# Patient Record
Sex: MALE | Race: WHITE | NOT HISPANIC OR LATINO | Employment: FULL TIME | URBAN - METROPOLITAN AREA
[De-identification: names, ages, dates, MRNs, and addresses within clinical notes are randomized per-mention and may not be internally consistent; named-entity substitution may affect disease eponyms.]

---

## 2021-06-11 ENCOUNTER — HOSPITAL ENCOUNTER (EMERGENCY)
Facility: HOSPITAL | Age: 51
Discharge: HOME | End: 2021-06-12
Attending: EMERGENCY MEDICINE
Payer: COMMERCIAL

## 2021-06-11 ENCOUNTER — APPOINTMENT (EMERGENCY)
Dept: CT IMAGING | Facility: HOSPITAL | Age: 51
End: 2021-06-11
Payer: COMMERCIAL

## 2021-06-11 VITALS
RESPIRATION RATE: 18 BRPM | BODY MASS INDEX: 34.64 KG/M2 | DIASTOLIC BLOOD PRESSURE: 101 MMHG | SYSTOLIC BLOOD PRESSURE: 142 MMHG | HEART RATE: 90 BPM | OXYGEN SATURATION: 97 % | WEIGHT: 220.68 LBS | HEIGHT: 67 IN | TEMPERATURE: 98.2 F

## 2021-06-11 DIAGNOSIS — R10.13 EPIGASTRIC PAIN: ICD-10-CM

## 2021-06-11 DIAGNOSIS — R07.89 ATYPICAL CHEST PAIN: Primary | ICD-10-CM

## 2021-06-11 DIAGNOSIS — K20.90 ESOPHAGITIS: ICD-10-CM

## 2021-06-11 LAB
ALBUMIN SERPL BCP-MCNC: 3.7 G/DL (ref 3.5–5)
ALP SERPL-CCNC: 104 U/L (ref 46–116)
ALT SERPL W P-5'-P-CCNC: 63 U/L (ref 12–78)
ANION GAP SERPL CALCULATED.3IONS-SCNC: 10 MMOL/L (ref 4–13)
AST SERPL W P-5'-P-CCNC: 68 U/L (ref 5–45)
ATRIAL RATE: 103 BPM
ATRIAL RATE: 108 BPM
BASOPHILS # BLD AUTO: 0.06 THOUSANDS/ΜL (ref 0–0.1)
BASOPHILS NFR BLD AUTO: 1 % (ref 0–1)
BILIRUB DIRECT SERPL-MCNC: 0.11 MG/DL (ref 0–0.2)
BILIRUB SERPL-MCNC: 0.45 MG/DL (ref 0.2–1)
BUN SERPL-MCNC: 7 MG/DL (ref 5–25)
CALCIUM SERPL-MCNC: 8 MG/DL (ref 8.3–10.1)
CHLORIDE SERPL-SCNC: 102 MMOL/L (ref 100–108)
CO2 SERPL-SCNC: 26 MMOL/L (ref 21–32)
CREAT SERPL-MCNC: 1.1 MG/DL (ref 0.6–1.3)
EOSINOPHIL # BLD AUTO: 0.02 THOUSAND/ΜL (ref 0–0.61)
EOSINOPHIL NFR BLD AUTO: 0 % (ref 0–6)
ERYTHROCYTE [DISTWIDTH] IN BLOOD BY AUTOMATED COUNT: 13.2 % (ref 11.6–15.1)
GFR SERPL CREATININE-BSD FRML MDRD: 77 ML/MIN/1.73SQ M
GLUCOSE SERPL-MCNC: 121 MG/DL (ref 65–140)
HCT VFR BLD AUTO: 47.7 % (ref 36.5–49.3)
HGB BLD-MCNC: 16.9 G/DL (ref 12–17)
IMM GRANULOCYTES # BLD AUTO: 0.03 THOUSAND/UL (ref 0–0.2)
IMM GRANULOCYTES NFR BLD AUTO: 0 % (ref 0–2)
LIPASE SERPL-CCNC: 119 U/L (ref 73–393)
LYMPHOCYTES # BLD AUTO: 2.5 THOUSANDS/ΜL (ref 0.6–4.47)
LYMPHOCYTES NFR BLD AUTO: 35 % (ref 14–44)
MCH RBC QN AUTO: 30.6 PG (ref 26.8–34.3)
MCHC RBC AUTO-ENTMCNC: 35.4 G/DL (ref 31.4–37.4)
MCV RBC AUTO: 86 FL (ref 82–98)
MONOCYTES # BLD AUTO: 0.79 THOUSAND/ΜL (ref 0.17–1.22)
MONOCYTES NFR BLD AUTO: 11 % (ref 4–12)
NEUTROPHILS # BLD AUTO: 3.73 THOUSANDS/ΜL (ref 1.85–7.62)
NEUTS SEG NFR BLD AUTO: 53 % (ref 43–75)
NRBC BLD AUTO-RTO: 0 /100 WBCS
P AXIS: 46 DEGREES
P AXIS: 46 DEGREES
PLATELET # BLD AUTO: 237 THOUSANDS/UL (ref 149–390)
PMV BLD AUTO: 10.4 FL (ref 8.9–12.7)
POTASSIUM SERPL-SCNC: 3.2 MMOL/L (ref 3.5–5.3)
PR INTERVAL: 160 MS
PR INTERVAL: 160 MS
PROT SERPL-MCNC: 7.2 G/DL (ref 6.4–8.2)
QRS AXIS: 26 DEGREES
QRS AXIS: 29 DEGREES
QRSD INTERVAL: 84 MS
QRSD INTERVAL: 84 MS
QT INTERVAL: 306 MS
QT INTERVAL: 316 MS
QTC INTERVAL: 410 MS
QTC INTERVAL: 413 MS
RBC # BLD AUTO: 5.52 MILLION/UL (ref 3.88–5.62)
SODIUM SERPL-SCNC: 138 MMOL/L (ref 136–145)
T WAVE AXIS: -3 DEGREES
T WAVE AXIS: 13 DEGREES
TROPONIN I SERPL-MCNC: <0.02 NG/ML
VENTRICULAR RATE: 103 BPM
VENTRICULAR RATE: 108 BPM
WBC # BLD AUTO: 7.13 THOUSAND/UL (ref 4.31–10.16)

## 2021-06-11 PROCEDURE — 80048 BASIC METABOLIC PNL TOTAL CA: CPT | Performed by: EMERGENCY MEDICINE

## 2021-06-11 PROCEDURE — 83690 ASSAY OF LIPASE: CPT | Performed by: EMERGENCY MEDICINE

## 2021-06-11 PROCEDURE — 96375 TX/PRO/DX INJ NEW DRUG ADDON: CPT

## 2021-06-11 PROCEDURE — 93010 ELECTROCARDIOGRAM REPORT: CPT | Performed by: INTERNAL MEDICINE

## 2021-06-11 PROCEDURE — 74177 CT ABD & PELVIS W/CONTRAST: CPT

## 2021-06-11 PROCEDURE — 36415 COLL VENOUS BLD VENIPUNCTURE: CPT | Performed by: EMERGENCY MEDICINE

## 2021-06-11 PROCEDURE — 84484 ASSAY OF TROPONIN QUANT: CPT | Performed by: EMERGENCY MEDICINE

## 2021-06-11 PROCEDURE — 80076 HEPATIC FUNCTION PANEL: CPT | Performed by: EMERGENCY MEDICINE

## 2021-06-11 PROCEDURE — 96365 THER/PROPH/DIAG IV INF INIT: CPT

## 2021-06-11 PROCEDURE — 96366 THER/PROPH/DIAG IV INF ADDON: CPT

## 2021-06-11 PROCEDURE — 99284 EMERGENCY DEPT VISIT MOD MDM: CPT | Performed by: EMERGENCY MEDICINE

## 2021-06-11 PROCEDURE — 99285 EMERGENCY DEPT VISIT HI MDM: CPT

## 2021-06-11 PROCEDURE — 85025 COMPLETE CBC W/AUTO DIFF WBC: CPT | Performed by: EMERGENCY MEDICINE

## 2021-06-11 PROCEDURE — 93005 ELECTROCARDIOGRAM TRACING: CPT

## 2021-06-11 RX ORDER — FAMOTIDINE 20 MG/1
20 TABLET, FILM COATED ORAL 2 TIMES DAILY
Qty: 14 TABLET | Refills: 0 | Status: SHIPPED | OUTPATIENT
Start: 2021-06-11

## 2021-06-11 RX ORDER — MAGNESIUM HYDROXIDE/ALUMINUM HYDROXICE/SIMETHICONE 120; 1200; 1200 MG/30ML; MG/30ML; MG/30ML
30 SUSPENSION ORAL ONCE
Status: COMPLETED | OUTPATIENT
Start: 2021-06-11 | End: 2021-06-11

## 2021-06-11 RX ORDER — MORPHINE SULFATE 4 MG/ML
4 INJECTION, SOLUTION INTRAMUSCULAR; INTRAVENOUS ONCE
Status: COMPLETED | OUTPATIENT
Start: 2021-06-11 | End: 2021-06-11

## 2021-06-11 RX ORDER — LIDOCAINE HYDROCHLORIDE 20 MG/ML
15 SOLUTION OROPHARYNGEAL ONCE
Status: COMPLETED | OUTPATIENT
Start: 2021-06-11 | End: 2021-06-11

## 2021-06-11 RX ORDER — SUCRALFATE ORAL 1 G/10ML
1 SUSPENSION ORAL 4 TIMES DAILY
Qty: 420 ML | Refills: 0 | Status: SHIPPED | OUTPATIENT
Start: 2021-06-11 | End: 2022-05-25

## 2021-06-11 RX ORDER — OMEPRAZOLE 20 MG/1
20 CAPSULE, DELAYED RELEASE ORAL 2 TIMES DAILY
Qty: 28 CAPSULE | Refills: 0 | Status: SHIPPED | OUTPATIENT
Start: 2021-06-11 | End: 2021-06-25

## 2021-06-11 RX ORDER — ONDANSETRON 4 MG/1
4 TABLET, ORALLY DISINTEGRATING ORAL EVERY 6 HOURS PRN
Qty: 20 TABLET | Refills: 0 | Status: SHIPPED | OUTPATIENT
Start: 2021-06-11 | End: 2022-05-25

## 2021-06-11 RX ORDER — SUCRALFATE 1 G/1
1 TABLET ORAL ONCE
Status: COMPLETED | OUTPATIENT
Start: 2021-06-11 | End: 2021-06-11

## 2021-06-11 RX ORDER — ONDANSETRON 2 MG/ML
4 INJECTION INTRAMUSCULAR; INTRAVENOUS ONCE
Status: COMPLETED | OUTPATIENT
Start: 2021-06-11 | End: 2021-06-11

## 2021-06-11 RX ORDER — KETOROLAC TROMETHAMINE 30 MG/ML
15 INJECTION, SOLUTION INTRAMUSCULAR; INTRAVENOUS ONCE
Status: COMPLETED | OUTPATIENT
Start: 2021-06-11 | End: 2021-06-11

## 2021-06-11 RX ORDER — FAMOTIDINE 20 MG/1
40 TABLET, FILM COATED ORAL ONCE
Status: COMPLETED | OUTPATIENT
Start: 2021-06-11 | End: 2021-06-11

## 2021-06-11 RX ADMIN — LIDOCAINE HYDROCHLORIDE 15 ML: 20 SOLUTION ORAL; TOPICAL at 21:59

## 2021-06-11 RX ADMIN — SODIUM CHLORIDE, SODIUM LACTATE, POTASSIUM CHLORIDE, AND CALCIUM CHLORIDE 1000 ML: .6; .31; .03; .02 INJECTION, SOLUTION INTRAVENOUS at 22:00

## 2021-06-11 RX ADMIN — KETOROLAC TROMETHAMINE 15 MG: 30 INJECTION, SOLUTION INTRAMUSCULAR at 22:00

## 2021-06-11 RX ADMIN — FAMOTIDINE 40 MG: 20 TABLET ORAL at 23:41

## 2021-06-11 RX ADMIN — ALUMINA, MAGNESIA, AND SIMETHICONE ORAL SUSPENSION REGULAR STRENGTH 30 ML: 1200; 1200; 120 SUSPENSION ORAL at 21:59

## 2021-06-11 RX ADMIN — ONDANSETRON 4 MG: 2 INJECTION INTRAMUSCULAR; INTRAVENOUS at 22:01

## 2021-06-11 RX ADMIN — IOHEXOL 100 ML: 350 INJECTION, SOLUTION INTRAVENOUS at 22:09

## 2021-06-11 RX ADMIN — SUCRALFATE 1 G: 1 TABLET ORAL at 23:41

## 2021-06-11 RX ADMIN — MORPHINE SULFATE 4 MG: 4 INJECTION INTRAVENOUS at 22:00

## 2021-06-11 NOTE — Clinical Note
Silvia Olguin was seen and treated in our emergency department on 6/11/2021  Diagnosis:     Carlos  may return to work on return date  He may return on this date: 06/14/2021         If you have any questions or concerns, please don't hesitate to call        Jef Guerrero MD    ______________________________           _______________          _______________  Hospital Representative                              Date                                Time

## 2021-06-12 NOTE — ED PROVIDER NOTES
History  Chief Complaint   Patient presents with    Chest Pain     Pt reports having chest pain that comes and goes since yesterday  wife reports hesd been drinking a lot  History provided by:  Patient   used: No    Chest Pain  Pain location:  Substernal area and epigastric  Pain quality: burning    Pain radiates to:  Does not radiate  Pain radiates to the back: no    Pain severity:  Severe  Onset quality:  Gradual  Duration:  1 day  Timing:  Constant  Progression:  Waxing and waning  Chronicity:  New  Relieved by:  Nothing  Worsened by:  Nothing tried  Ineffective treatments:  None tried  Associated symptoms: nausea    Associated symptoms: no abdominal pain, no back pain, no cough, no fever, no palpitations, no shortness of breath and not vomiting        None       History reviewed  No pertinent past medical history  History reviewed  No pertinent surgical history  History reviewed  No pertinent family history  I have reviewed and agree with the history as documented  E-Cigarette/Vaping    E-Cigarette Use Never User      E-Cigarette/Vaping Substances     Social History     Tobacco Use    Smoking status: Never Smoker    Smokeless tobacco: Never Used   Vaping Use    Vaping Use: Never used   Substance Use Topics    Alcohol use: Not on file    Drug use: Never       Review of Systems   Constitutional: Negative for chills and fever  HENT: Negative for ear pain and sore throat  Eyes: Negative for pain and visual disturbance  Respiratory: Negative for cough and shortness of breath  Cardiovascular: Positive for chest pain  Negative for palpitations  Gastrointestinal: Positive for nausea  Negative for abdominal pain and vomiting  Genitourinary: Negative for dysuria and hematuria  Musculoskeletal: Negative for arthralgias and back pain  Skin: Negative for color change and rash  Neurological: Negative for seizures and syncope     All other systems reviewed and are negative  Physical Exam  Physical Exam  Vitals and nursing note reviewed  Constitutional:       Appearance: He is well-developed  HENT:      Head: Normocephalic and atraumatic  Eyes:      Conjunctiva/sclera: Conjunctivae normal    Cardiovascular:      Rate and Rhythm: Normal rate and regular rhythm  Heart sounds: No murmur heard  Pulmonary:      Effort: Pulmonary effort is normal  No respiratory distress  Breath sounds: Normal breath sounds  Abdominal:      Palpations: Abdomen is soft  Tenderness: There is no abdominal tenderness  Musculoskeletal:      Cervical back: Neck supple  Skin:     General: Skin is warm and dry  Capillary Refill: Capillary refill takes less than 2 seconds  Neurological:      General: No focal deficit present  Mental Status: He is alert and oriented to person, place, and time           Vital Signs  ED Triage Vitals   Temperature Pulse Respirations Blood Pressure SpO2   06/11/21 2035 06/11/21 2035 06/11/21 2035 06/11/21 2035 06/11/21 2035   98 2 °F (36 8 °C) (!) 106 18 134/94 94 %      Temp Source Heart Rate Source Patient Position - Orthostatic VS BP Location FiO2 (%)   06/11/21 2035 06/11/21 2035 06/11/21 2100 06/11/21 2035 --   Oral Monitor Lying Right arm       Pain Score       06/11/21 2100       6           Vitals:    06/11/21 2035 06/11/21 2100 06/11/21 2215 06/11/21 2344   BP: 134/94 130/83 120/81 (!) 142/101   Pulse: (!) 106 99 94 90   Patient Position - Orthostatic VS:  Lying  Sitting         Visual Acuity      ED Medications  Medications   lactated ringers bolus 1,000 mL (0 mL Intravenous Stopped 6/12/21 0004)   ondansetron (ZOFRAN) injection 4 mg (4 mg Intravenous Given 6/11/21 2201)   ketorolac (TORADOL) injection 15 mg (15 mg Intravenous Given 6/11/21 2200)   morphine (PF) 4 mg/mL injection 4 mg (4 mg Intravenous Given 6/11/21 2200)   aluminum-magnesium hydroxide-simethicone (MYLANTA) oral suspension 30 mL (30 mL Oral Given 6/11/21 2159)   Lidocaine Viscous HCl (XYLOCAINE) 2 % mucosal solution 15 mL (15 mL Swish & Spit Given 6/11/21 2159)   iohexol (OMNIPAQUE) 350 MG/ML injection (SINGLE-DOSE) 100 mL (100 mL Intravenous Given 6/11/21 2209)   sucralfate (CARAFATE) tablet 1 g (1 g Oral Given 6/11/21 2341)   famotidine (PEPCID) tablet 40 mg (40 mg Oral Given 6/11/21 2341)       Diagnostic Studies  Results Reviewed     Procedure Component Value Units Date/Time    Lipase [[id number]]  (Normal) Collected: 06/11/21 2137    Lab Status: Final result Specimen: Blood from Arm, Left Updated: 06/11/21 2204     Lipase 119 u/L     Basic metabolic panel [[id number]]  (Abnormal) Collected: 06/11/21 2137    Lab Status: Final result Specimen: Blood from Arm, Left Updated: 06/11/21 2204     Sodium 138 mmol/L      Potassium 3 2 mmol/L      Chloride 102 mmol/L      CO2 26 mmol/L      ANION GAP 10 mmol/L      BUN 7 mg/dL      Creatinine 1 10 mg/dL      Glucose 121 mg/dL      Calcium 8 0 mg/dL      eGFR 77 ml/min/1 73sq m     Narrative:      [place] guidelines for Chronic Kidney Disease (CKD):     Stage 1 with normal or high GFR (GFR > 90 mL/min/1 73 square meters)    Stage 2 Mild CKD (GFR = 60-89 mL/min/1 73 square meters)    Stage 3A Moderate CKD (GFR = 45-59 mL/min/1 73 square meters)    Stage 3B Moderate CKD (GFR = 30-44 mL/min/1 73 square meters)    Stage 4 Severe CKD (GFR = 15-29 mL/min/1 73 square meters)    Stage 5 End Stage CKD (GFR <15 mL/min/1 73 square meters)  Note: GFR calculation is accurate only with a steady state creatinine    Hepatic function panel [[id number]]  (Abnormal) Collected: 06/11/21 2137    Lab Status: Final result Specimen: Blood from Arm, Left Updated: 06/11/21 2204     Total Bilirubin 0 45 mg/dL      Bilirubin, Direct 0 11 mg/dL      Alkaline Phosphatase 104 U/L      AST 68 U/L      ALT 63 U/L      Total Protein 7 2 g/dL      Albumin 3 7 g/dL     Troponin I [[id number]]  (Normal) Collected: 06/11/21 4450 Lab Status: Final result Specimen: Blood from Arm, Left Updated: 06/11/21 2137     Troponin I <0 02 ng/mL     CBC and differential [[id number]] Collected: 06/11/21 2106    Lab Status: Final result Specimen: Blood from Arm, Left Updated: 06/11/21 2121     WBC 7 13 Thousand/uL      RBC 5 52 Million/uL      Hemoglobin 16 9 g/dL      Hematocrit 47 7 %      MCV 86 fL      MCH 30 6 pg      MCHC 35 4 g/dL      RDW 13 2 %      MPV 10 4 fL      Platelets 562 Thousands/uL      nRBC 0 /100 WBCs      Neutrophils Relative 53 %      Immat GRANS % 0 %      Lymphocytes Relative 35 %      Monocytes Relative 11 %      Eosinophils Relative 0 %      Basophils Relative 1 %      Neutrophils Absolute 3 73 Thousands/µL      Immature Grans Absolute 0 03 Thousand/uL      Lymphocytes Absolute 2 50 Thousands/µL      Monocytes Absolute 0 79 Thousand/µL      Eosinophils Absolute 0 02 Thousand/µL      Basophils Absolute 0 06 Thousands/µL                  CT abdomen pelvis with contrast   Final Result by Ale Garza MD (06/11 2312)      Moderate circumferential wall thickening noted of the visualized distal esophagus suspicious for an esophagitis  Nonemergent esophagram or endoscopy could be performed for further evaluation  No free air or free fluid              Workstation performed: [id number]                    Procedures  Procedures         ED Course             HEART Risk Score      Most Recent Value   Heart Score Risk Calculator   History  0 Filed at: 06/11/2021 2151   ECG  0 Filed at: 06/11/2021 2151   Age  1 Filed at: 06/11/2021 2151   Risk Factors  1 Filed at: 06/11/2021 2151   Troponin  0 Filed at: 06/11/2021 2151   HEART Score  2 Filed at: 06/11/2021 2151                                    MDM  Number of Diagnoses or Management Options  Atypical chest pain: new and requires workup  Epigastric pain: new and requires workup  Esophagitis: new and requires workup  Diagnosis management comments: A 58-year-old male presented for severe epigastric and substernal chest pain that started yesterday, waxing and waning  Patient does admit to heavy alcohol use over the past several days  He was visiting the area and was celebrating with friends and family, reports that he does not typically drink so heavily  His symptoms improved significantly with GI cocktail and Pepcid, Carafate  Laboratory studies significant for mild elevation in AST, consistent with reported recent heavy alcohol use  Troponin is negative an EKG is without acute ST changes to suggest cardiac ischemia  CT scan of the abdomen pelvis does suggest esophagitis which is again consistent with the patient's reported recent heavy alcohol use  We discussed these findings with patient and his wife  Heart score is two  Recommend follow-up with PCP and Gastroenterology  Discussed return precautions  Amount and/or Complexity of Data Reviewed  Clinical lab tests: reviewed and ordered  Tests in the radiology section of CPT®: reviewed and ordered  Review and summarize past medical records: yes  Independent visualization of images, tracings, or specimens: yes        Disposition  Final diagnoses:   Atypical chest pain   Epigastric pain   Esophagitis     Time reflects when diagnosis was documented in both MDM as applicable and the Disposition within this note     Time User Action Codes Description Comment    6/11/2021 11:32 PM Clerance Selam Add [R07 89] Atypical chest pain     6/11/2021 11:32 PM Clerance Selam Remove [R07 89] Atypical chest pain     6/11/2021 11:32 PM Clerance Selam Add [R07 89] Atypical chest pain     6/11/2021 11:32 PM Clerance Selam Add [R10 13] Epigastric pain     6/11/2021 11:32 PM Clerance Selam Add [K20 90] Esophagitis       ED Disposition     ED Disposition Condition Date/Time Comment    Discharge Stable Fri Jun 11, 2021 11:32  [address] [facility] [address] discharge to home/self care              Follow-up Information     Follow up With Specialties Details Why Contact Info Additional Information    [facility] [place] [facility]   [facility] [address] [address] [address] [facility] [place], [address] [facility] [address], [place], [place], [phone]          Discharge Medication List as of 6/11/2021 11:36 PM      START taking these medications    Details   famotidine (PEPCID) 20 mg tablet Take 1 tablet (20 mg total) by mouth 2 (two) times a day, Starting Fri 6/11/2021, Print      omeprazole (PriLOSEC) 20 mg delayed release capsule Take 1 capsule (20 mg total) by mouth 2 (two) times a day for 14 days, Starting Fri 6/11/2021, Until Fri 6/25/2021, Print      ondansetron (ZOFRAN-ODT) 4 mg disintegrating tablet Take 1 tablet (4 mg total) by mouth every 6 (six) hours as needed for nausea or vomiting, Starting Fri 6/11/2021, Print      sucralfate (CARAFATE) 1 g/10 mL suspension Take 10 mL (1 g total) by mouth 4 (four) times a day for 11 days, Starting Fri 6/11/2021, Until Tue 6/22/2021, Print           No discharge procedures on file      PDMP Review     None          ED Provider  Electronically Signed by           Emily Rodriguez MD  07/06/21 0625

## 2022-05-17 ENCOUNTER — APPOINTMENT (EMERGENCY)
Dept: CT IMAGING | Facility: HOSPITAL | Age: 52
End: 2022-05-17
Payer: COMMERCIAL

## 2022-05-17 ENCOUNTER — HOSPITAL ENCOUNTER (EMERGENCY)
Facility: HOSPITAL | Age: 52
Discharge: HOME | End: 2022-05-18
Attending: EMERGENCY MEDICINE
Payer: COMMERCIAL

## 2022-05-17 DIAGNOSIS — R41.0 INTERMITTENT CONFUSION: Primary | ICD-10-CM

## 2022-05-17 DIAGNOSIS — R51.9 HEADACHE: ICD-10-CM

## 2022-05-17 LAB
ALBUMIN SERPL BCP-MCNC: 3.9 G/DL (ref 3.5–5)
ALP SERPL-CCNC: 108 U/L (ref 46–116)
ALT SERPL W P-5'-P-CCNC: 30 U/L (ref 12–78)
AMMONIA PLAS-SCNC: 13 UMOL/L (ref 11–35)
ANION GAP SERPL CALCULATED.3IONS-SCNC: 7 MMOL/L (ref 4–13)
APAP SERPL-MCNC: <2 UG/ML (ref 10–20)
APTT PPP: 27 SECONDS (ref 23–37)
AST SERPL W P-5'-P-CCNC: 14 U/L (ref 5–45)
BASE EX.OXY STD BLDV CALC-SCNC: 72.8 % (ref 60–80)
BASE EXCESS BLDV CALC-SCNC: 1.6 MMOL/L
BASOPHILS # BLD AUTO: 0.05 THOUSANDS/ΜL (ref 0–0.1)
BASOPHILS NFR BLD AUTO: 1 % (ref 0–1)
BILIRUB SERPL-MCNC: 0.41 MG/DL (ref 0.2–1)
BUN SERPL-MCNC: 21 MG/DL (ref 5–25)
CALCIUM SERPL-MCNC: 9 MG/DL (ref 8.3–10.1)
CHLORIDE SERPL-SCNC: 103 MMOL/L (ref 100–108)
CO2 SERPL-SCNC: 29 MMOL/L (ref 21–32)
CREAT SERPL-MCNC: 1.17 MG/DL (ref 0.6–1.3)
EOSINOPHIL # BLD AUTO: 0.14 THOUSAND/ΜL (ref 0–0.61)
EOSINOPHIL NFR BLD AUTO: 1 % (ref 0–6)
ERYTHROCYTE [DISTWIDTH] IN BLOOD BY AUTOMATED COUNT: 13.2 % (ref 11.6–15.1)
ETHANOL SERPL-MCNC: <3 MG/DL (ref 0–3)
GFR SERPL CREATININE-BSD FRML MDRD: 70 ML/MIN/1.73SQ M
GLUCOSE SERPL-MCNC: 99 MG/DL (ref 65–140)
HCO3 BLDV-SCNC: 27.3 MMOL/L (ref 24–30)
HCT VFR BLD AUTO: 44.5 % (ref 36.5–49.3)
HGB BLD-MCNC: 15.1 G/DL (ref 12–17)
IMM GRANULOCYTES # BLD AUTO: 0.04 THOUSAND/UL (ref 0–0.2)
IMM GRANULOCYTES NFR BLD AUTO: 0 % (ref 0–2)
INR PPP: 0.93 (ref 0.84–1.19)
LYMPHOCYTES # BLD AUTO: 3.23 THOUSANDS/ΜL (ref 0.6–4.47)
LYMPHOCYTES NFR BLD AUTO: 30 % (ref 14–44)
MCH RBC QN AUTO: 29.8 PG (ref 26.8–34.3)
MCHC RBC AUTO-ENTMCNC: 33.9 G/DL (ref 31.4–37.4)
MCV RBC AUTO: 88 FL (ref 82–98)
MONOCYTES # BLD AUTO: 0.99 THOUSAND/ΜL (ref 0.17–1.22)
MONOCYTES NFR BLD AUTO: 9 % (ref 4–12)
NEUTROPHILS # BLD AUTO: 6.2 THOUSANDS/ΜL (ref 1.85–7.62)
NEUTS SEG NFR BLD AUTO: 59 % (ref 43–75)
NRBC BLD AUTO-RTO: 0 /100 WBCS
O2 CT BLDV-SCNC: 16.1 ML/DL
PCO2 BLDV: 46.8 MM HG (ref 42–50)
PH BLDV: 7.38 [PH] (ref 7.3–7.4)
PLATELET # BLD AUTO: 224 THOUSANDS/UL (ref 149–390)
PMV BLD AUTO: 11.2 FL (ref 8.9–12.7)
PO2 BLDV: 38.4 MM HG (ref 35–45)
POTASSIUM SERPL-SCNC: 4.3 MMOL/L (ref 3.5–5.3)
PROT SERPL-MCNC: 7.1 G/DL (ref 6.4–8.2)
PROTHROMBIN TIME: 12.2 SECONDS (ref 11.6–14.5)
RBC # BLD AUTO: 5.06 MILLION/UL (ref 3.88–5.62)
SALICYLATES SERPL-MCNC: <3 MG/DL (ref 3–20)
SODIUM SERPL-SCNC: 139 MMOL/L (ref 136–145)
WBC # BLD AUTO: 10.65 THOUSAND/UL (ref 4.31–10.16)

## 2022-05-17 PROCEDURE — 85610 PROTHROMBIN TIME: CPT | Performed by: EMERGENCY MEDICINE

## 2022-05-17 PROCEDURE — 99285 EMERGENCY DEPT VISIT HI MDM: CPT | Performed by: EMERGENCY MEDICINE

## 2022-05-17 PROCEDURE — 85730 THROMBOPLASTIN TIME PARTIAL: CPT | Performed by: EMERGENCY MEDICINE

## 2022-05-17 PROCEDURE — 99285 EMERGENCY DEPT VISIT HI MDM: CPT

## 2022-05-17 PROCEDURE — 85025 COMPLETE CBC W/AUTO DIFF WBC: CPT | Performed by: EMERGENCY MEDICINE

## 2022-05-17 PROCEDURE — 80053 COMPREHEN METABOLIC PANEL: CPT | Performed by: EMERGENCY MEDICINE

## 2022-05-17 PROCEDURE — 80143 DRUG ASSAY ACETAMINOPHEN: CPT | Performed by: EMERGENCY MEDICINE

## 2022-05-17 PROCEDURE — 82077 ASSAY SPEC XCP UR&BREATH IA: CPT | Performed by: EMERGENCY MEDICINE

## 2022-05-17 PROCEDURE — 70450 CT HEAD/BRAIN W/O DYE: CPT

## 2022-05-17 PROCEDURE — 93005 ELECTROCARDIOGRAM TRACING: CPT

## 2022-05-17 PROCEDURE — 82140 ASSAY OF AMMONIA: CPT | Performed by: EMERGENCY MEDICINE

## 2022-05-17 PROCEDURE — 36415 COLL VENOUS BLD VENIPUNCTURE: CPT | Performed by: EMERGENCY MEDICINE

## 2022-05-17 PROCEDURE — 80179 DRUG ASSAY SALICYLATE: CPT | Performed by: EMERGENCY MEDICINE

## 2022-05-17 PROCEDURE — 82805 BLOOD GASES W/O2 SATURATION: CPT | Performed by: EMERGENCY MEDICINE

## 2022-05-18 VITALS
SYSTOLIC BLOOD PRESSURE: 142 MMHG | WEIGHT: 212.96 LBS | TEMPERATURE: 98.3 F | HEART RATE: 68 BPM | BODY MASS INDEX: 33.35 KG/M2 | DIASTOLIC BLOOD PRESSURE: 82 MMHG | RESPIRATION RATE: 18 BRPM | OXYGEN SATURATION: 98 %

## 2022-05-18 LAB
ATRIAL RATE: 69 BPM
P AXIS: 37 DEGREES
PR INTERVAL: 168 MS
QRS AXIS: 41 DEGREES
QRSD INTERVAL: 86 MS
QT INTERVAL: 366 MS
QTC INTERVAL: 392 MS
T WAVE AXIS: 9 DEGREES
VENTRICULAR RATE: 69 BPM

## 2022-05-18 PROCEDURE — 93010 ELECTROCARDIOGRAM REPORT: CPT | Performed by: INTERNAL MEDICINE

## 2022-05-23 ENCOUNTER — APPOINTMENT (OUTPATIENT)
Dept: MRI IMAGING | Facility: HOSPITAL | Age: 52
End: 2022-05-23
Payer: COMMERCIAL

## 2022-05-23 ENCOUNTER — HOSPITAL ENCOUNTER (OUTPATIENT)
Facility: HOSPITAL | Age: 52
Setting detail: OBSERVATION
Discharge: HOME | End: 2022-05-25
Attending: EMERGENCY MEDICINE | Admitting: FAMILY MEDICINE
Payer: COMMERCIAL

## 2022-05-23 ENCOUNTER — APPOINTMENT (EMERGENCY)
Dept: CT IMAGING | Facility: HOSPITAL | Age: 52
End: 2022-05-23
Payer: COMMERCIAL

## 2022-05-23 ENCOUNTER — APPOINTMENT (EMERGENCY)
Dept: RADIOLOGY | Facility: HOSPITAL | Age: 52
End: 2022-05-23
Payer: COMMERCIAL

## 2022-05-23 DIAGNOSIS — R41.82 ALTERED MENTAL STATUS: Primary | ICD-10-CM

## 2022-05-23 DIAGNOSIS — E87.2 LACTIC ACIDOSIS: ICD-10-CM

## 2022-05-23 DIAGNOSIS — R45.1 AGITATION: ICD-10-CM

## 2022-05-23 LAB
2HR DELTA HS TROPONIN: 1 NG/L
4HR DELTA HS TROPONIN: 0 NG/L
ALBUMIN SERPL BCP-MCNC: 3.8 G/DL (ref 3.5–5)
ALP SERPL-CCNC: 98 U/L (ref 46–116)
ALT SERPL W P-5'-P-CCNC: 47 U/L (ref 12–78)
AMMONIA PLAS-SCNC: 18 UMOL/L (ref 11–35)
AMPHETAMINES SERPL QL SCN: NEGATIVE
ANION GAP SERPL CALCULATED.3IONS-SCNC: 11 MMOL/L (ref 4–13)
AST SERPL W P-5'-P-CCNC: 52 U/L (ref 5–45)
ATRIAL RATE: 84 BPM
BARBITURATES UR QL: NEGATIVE
BASOPHILS # BLD AUTO: 0.04 THOUSANDS/ΜL (ref 0–0.1)
BASOPHILS NFR BLD AUTO: 1 % (ref 0–1)
BENZODIAZ UR QL: NEGATIVE
BILIRUB SERPL-MCNC: 0.51 MG/DL (ref 0.2–1)
BILIRUB UR QL STRIP: NEGATIVE
BUN SERPL-MCNC: 12 MG/DL (ref 5–25)
CALCIUM SERPL-MCNC: 9.1 MG/DL (ref 8.3–10.1)
CARDIAC TROPONIN I PNL SERPL HS: 5 NG/L
CARDIAC TROPONIN I PNL SERPL HS: 5 NG/L
CARDIAC TROPONIN I PNL SERPL HS: 6 NG/L
CHLORIDE SERPL-SCNC: 102 MMOL/L (ref 100–108)
CLARITY UR: CLEAR
CO2 SERPL-SCNC: 26 MMOL/L (ref 21–32)
COCAINE UR QL: NEGATIVE
COLOR UR: YELLOW
CREAT SERPL-MCNC: 1.17 MG/DL (ref 0.6–1.3)
EOSINOPHIL # BLD AUTO: 0.08 THOUSAND/ΜL (ref 0–0.61)
EOSINOPHIL NFR BLD AUTO: 1 % (ref 0–6)
ERYTHROCYTE [DISTWIDTH] IN BLOOD BY AUTOMATED COUNT: 13.2 % (ref 11.6–15.1)
ETHANOL SERPL-MCNC: <3 MG/DL (ref 0–3)
FOLATE SERPL-MCNC: 19.2 NG/ML (ref 3.1–17.5)
GFR SERPL CREATININE-BSD FRML MDRD: 70 ML/MIN/1.73SQ M
GLUCOSE SERPL-MCNC: 118 MG/DL (ref 65–140)
GLUCOSE SERPL-MCNC: 125 MG/DL (ref 65–140)
GLUCOSE SERPL-MCNC: 140 MG/DL (ref 65–140)
GLUCOSE UR STRIP-MCNC: NEGATIVE MG/DL
HCT VFR BLD AUTO: 46.6 % (ref 36.5–49.3)
HGB BLD-MCNC: 15.8 G/DL (ref 12–17)
HGB UR QL STRIP.AUTO: NEGATIVE
IMM GRANULOCYTES # BLD AUTO: 0.03 THOUSAND/UL (ref 0–0.2)
IMM GRANULOCYTES NFR BLD AUTO: 0 % (ref 0–2)
KETONES UR STRIP-MCNC: NEGATIVE MG/DL
LACTATE SERPL-SCNC: 1.3 MMOL/L (ref 0.5–2)
LACTATE SERPL-SCNC: 2.8 MMOL/L (ref 0.5–2)
LEUKOCYTE ESTERASE UR QL STRIP: NEGATIVE
LYMPHOCYTES # BLD AUTO: 2.16 THOUSANDS/ΜL (ref 0.6–4.47)
LYMPHOCYTES NFR BLD AUTO: 27 % (ref 14–44)
MAGNESIUM SERPL-MCNC: 2 MG/DL (ref 1.6–2.6)
MCH RBC QN AUTO: 29.6 PG (ref 26.8–34.3)
MCHC RBC AUTO-ENTMCNC: 33.9 G/DL (ref 31.4–37.4)
MCV RBC AUTO: 87 FL (ref 82–98)
METHADONE UR QL: NEGATIVE
MONOCYTES # BLD AUTO: 0.61 THOUSAND/ΜL (ref 0.17–1.22)
MONOCYTES NFR BLD AUTO: 8 % (ref 4–12)
NEUTROPHILS # BLD AUTO: 5.21 THOUSANDS/ΜL (ref 1.85–7.62)
NEUTS SEG NFR BLD AUTO: 63 % (ref 43–75)
NITRITE UR QL STRIP: NEGATIVE
NRBC BLD AUTO-RTO: 0 /100 WBCS
OPIATES UR QL SCN: NEGATIVE
OXYCODONE+OXYMORPHONE UR QL SCN: NEGATIVE
P AXIS: 51 DEGREES
PCP UR QL: NEGATIVE
PH UR STRIP.AUTO: 6 [PH]
PLATELET # BLD AUTO: 238 THOUSANDS/UL (ref 149–390)
PMV BLD AUTO: 11.1 FL (ref 8.9–12.7)
POTASSIUM SERPL-SCNC: 3.9 MMOL/L (ref 3.5–5.3)
PR INTERVAL: 160 MS
PROT SERPL-MCNC: 7.6 G/DL (ref 6.4–8.2)
PROT UR STRIP-MCNC: NEGATIVE MG/DL
QRS AXIS: 37 DEGREES
QRSD INTERVAL: 80 MS
QT INTERVAL: 324 MS
QTC INTERVAL: 382 MS
RBC # BLD AUTO: 5.33 MILLION/UL (ref 3.88–5.62)
SODIUM SERPL-SCNC: 139 MMOL/L (ref 136–145)
SP GR UR STRIP.AUTO: 1.02 (ref 1–1.03)
T WAVE AXIS: 36 DEGREES
THC UR QL: NEGATIVE
TSH SERPL DL<=0.05 MIU/L-ACNC: 2.07 UIU/ML (ref 0.45–4.5)
UROBILINOGEN UR QL STRIP.AUTO: 0.2 E.U./DL
VENTRICULAR RATE: 84 BPM
VIT B12 SERPL-MCNC: 199 PG/ML (ref 100–900)
WBC # BLD AUTO: 8.13 THOUSAND/UL (ref 4.31–10.16)

## 2022-05-23 PROCEDURE — 99205 OFFICE O/P NEW HI 60 MIN: CPT | Performed by: PSYCHIATRY & NEUROLOGY

## 2022-05-23 PROCEDURE — 82607 VITAMIN B-12: CPT | Performed by: PHYSICIAN ASSISTANT

## 2022-05-23 PROCEDURE — 83735 ASSAY OF MAGNESIUM: CPT | Performed by: EMERGENCY MEDICINE

## 2022-05-23 PROCEDURE — 96360 HYDRATION IV INFUSION INIT: CPT

## 2022-05-23 PROCEDURE — 81003 URINALYSIS AUTO W/O SCOPE: CPT | Performed by: EMERGENCY MEDICINE

## 2022-05-23 PROCEDURE — 70553 MRI BRAIN STEM W/O & W/DYE: CPT

## 2022-05-23 PROCEDURE — 99220 PR INITIAL OBSERVATION CARE/DAY 70 MINUTES: CPT | Performed by: FAMILY MEDICINE

## 2022-05-23 PROCEDURE — 83605 ASSAY OF LACTIC ACID: CPT | Performed by: EMERGENCY MEDICINE

## 2022-05-23 PROCEDURE — 84443 ASSAY THYROID STIM HORMONE: CPT | Performed by: EMERGENCY MEDICINE

## 2022-05-23 PROCEDURE — 86592 SYPHILIS TEST NON-TREP QUAL: CPT | Performed by: PHYSICIAN ASSISTANT

## 2022-05-23 PROCEDURE — 36415 COLL VENOUS BLD VENIPUNCTURE: CPT | Performed by: EMERGENCY MEDICINE

## 2022-05-23 PROCEDURE — 80053 COMPREHEN METABOLIC PANEL: CPT | Performed by: EMERGENCY MEDICINE

## 2022-05-23 PROCEDURE — A9585 GADOBUTROL INJECTION: HCPCS | Performed by: PHYSICIAN ASSISTANT

## 2022-05-23 PROCEDURE — 71046 X-RAY EXAM CHEST 2 VIEWS: CPT

## 2022-05-23 PROCEDURE — 82948 REAGENT STRIP/BLOOD GLUCOSE: CPT

## 2022-05-23 PROCEDURE — 85025 COMPLETE CBC W/AUTO DIFF WBC: CPT | Performed by: EMERGENCY MEDICINE

## 2022-05-23 PROCEDURE — 84484 ASSAY OF TROPONIN QUANT: CPT | Performed by: EMERGENCY MEDICINE

## 2022-05-23 PROCEDURE — 82077 ASSAY SPEC XCP UR&BREATH IA: CPT | Performed by: EMERGENCY MEDICINE

## 2022-05-23 PROCEDURE — 99285 EMERGENCY DEPT VISIT HI MDM: CPT

## 2022-05-23 PROCEDURE — 93010 ELECTROCARDIOGRAM REPORT: CPT | Performed by: INTERNAL MEDICINE

## 2022-05-23 PROCEDURE — 93005 ELECTROCARDIOGRAM TRACING: CPT

## 2022-05-23 PROCEDURE — 82746 ASSAY OF FOLIC ACID SERUM: CPT | Performed by: PHYSICIAN ASSISTANT

## 2022-05-23 PROCEDURE — 99285 EMERGENCY DEPT VISIT HI MDM: CPT | Performed by: EMERGENCY MEDICINE

## 2022-05-23 PROCEDURE — 70450 CT HEAD/BRAIN W/O DYE: CPT

## 2022-05-23 PROCEDURE — 80307 DRUG TEST PRSMV CHEM ANLYZR: CPT | Performed by: EMERGENCY MEDICINE

## 2022-05-23 PROCEDURE — 84425 ASSAY OF VITAMIN B-1: CPT | Performed by: PHYSICIAN ASSISTANT

## 2022-05-23 PROCEDURE — 82140 ASSAY OF AMMONIA: CPT | Performed by: EMERGENCY MEDICINE

## 2022-05-23 PROCEDURE — G1004 CDSM NDSC: HCPCS

## 2022-05-23 RX ORDER — LORAZEPAM 0.5 MG/1
0.5 TABLET ORAL ONCE
Status: COMPLETED | OUTPATIENT
Start: 2022-05-23 | End: 2022-05-23

## 2022-05-23 RX ORDER — FAMOTIDINE 20 MG/1
20 TABLET, FILM COATED ORAL 2 TIMES DAILY
Status: DISCONTINUED | OUTPATIENT
Start: 2022-05-23 | End: 2022-05-25 | Stop reason: HOSPADM

## 2022-05-23 RX ORDER — LORAZEPAM 2 MG/ML
4 INJECTION INTRAMUSCULAR ONCE
Status: COMPLETED | OUTPATIENT
Start: 2022-05-23 | End: 2022-05-23

## 2022-05-23 RX ORDER — LANOLIN ALCOHOL/MO/W.PET/CERES
100 CREAM (GRAM) TOPICAL DAILY
Status: DISCONTINUED | OUTPATIENT
Start: 2022-05-23 | End: 2022-05-25 | Stop reason: HOSPADM

## 2022-05-23 RX ORDER — ONDANSETRON 2 MG/ML
4 INJECTION INTRAMUSCULAR; INTRAVENOUS EVERY 6 HOURS PRN
Status: DISCONTINUED | OUTPATIENT
Start: 2022-05-23 | End: 2022-05-25 | Stop reason: HOSPADM

## 2022-05-23 RX ADMIN — GADOBUTROL 9 ML: 604.72 INJECTION INTRAVENOUS at 11:48

## 2022-05-23 RX ADMIN — SODIUM CHLORIDE 1000 ML: 0.9 INJECTION, SOLUTION INTRAVENOUS at 07:50

## 2022-05-23 RX ADMIN — FAMOTIDINE 20 MG: 20 TABLET ORAL at 18:02

## 2022-05-23 RX ADMIN — LORAZEPAM 0.5 MG: 0.5 TABLET ORAL at 08:42

## 2022-05-23 RX ADMIN — FAMOTIDINE 20 MG: 20 TABLET ORAL at 10:24

## 2022-05-23 RX ADMIN — LORAZEPAM 4 MG: 2 INJECTION INTRAMUSCULAR; INTRAVENOUS at 15:09

## 2022-05-23 NOTE — H&P
[facility]  H&P- [address] [facility] [address] [facility] Karissa 1970, 47 y o  male MRN: [id number]  Unit/Bed#: MS [facility]-01 Encounter: [id number]  Primary Care Provider: No primary care provider on file  Date and time admitted to hospital: 5/23/2022  7:09 AM    * Altered mental status  Assessment & Plan  · Pt with h/o alcoholism whose last drink was on 5/11 and 5/12 - couple beers  · Was found to be very confused and missing a few days ago and found at his work place in [place] when he lives here in [place]  · His family brought him to ER for further workup  · He was on disulfiram implant which has been removed after he drank beer while he had the implant  · As per family his symptoms were present before then  · ER spoke with neuro who recommended MRI, CT head was unremarkable  · Pt is not willing to stay but he is due to family pressure  · Neuro consult pending      VTE Pharmacologic Prophylaxis: VTE Score: 2 Low Risk (Score 0-2) - Encourage Ambulation  Code Status: Level 1 - Full Code   Discussion with family: discussed with pts wife at bedside and dtr over the phone  Anticipated Length of Stay: Patient will be admitted on an observation basis with an anticipated length of stay of less than 2 midnights secondary to need for neuro consult and MRI  Total Time for Visit, including Counseling / Coordination of Care: 45 minutes Greater than 50% of this total time spent on direct patient counseling and coordination of care  Chief Complaint: AMS    History of Present Illness:  [address] [facility] Karissa is a 47 y o  male with a PMH of alcoholism who presents with confusion that has been waxing and waning for the last few days  He was so confused once that he went missing and was found at this work place in [place]  Pt is aware of this but does not committ to being confused  He maintains that he is alright and is here due to family pressure    As endorsed by the pts wife and daughter   Pt refuses any symptoms  Review of Systems:  Review of Systems   Constitutional: Positive for appetite change  Negative for chills, diaphoresis and fatigue  HENT: Negative for congestion, dental problem, drooling, ear discharge and ear pain  Respiratory: Negative for cough, choking, shortness of breath, wheezing and stridor  Cardiovascular: Negative for chest pain and leg swelling  Gastrointestinal: Negative for abdominal distention, abdominal pain, anal bleeding and blood in stool  Endocrine: Negative for cold intolerance and heat intolerance  Genitourinary: Negative for difficulty urinating, dysuria, flank pain, frequency and hematuria  Musculoskeletal: Negative for gait problem, myalgias and neck pain  Neurological: Positive for dizziness, weakness and light-headedness  Negative for tremors, syncope, speech difficulty and headaches  Psychiatric/Behavioral: Positive for behavioral problems, confusion, decreased concentration and sleep disturbance  Negative for agitation, hallucinations and self-injury  The patient is nervous/anxious  The patient is not hyperactive  Past Medical and Surgical History:   Past Medical History:   Diagnosis Date    ETOH abuse        No past surgical history on file  Meds/Allergies:  Prior to Admission medications    Medication Sig Start Date End Date Taking?  Authorizing Provider   famotidine (PEPCID) 20 mg tablet Take 1 tablet (20 mg total) by mouth 2 (two) times a day 6/11/21   Dominik Jasmine MD   omeprazole (PriLOSEC) 20 mg delayed release capsule Take 1 capsule (20 mg total) by mouth 2 (two) times a day for 14 days 6/11/21 6/25/21  Dominik Jasmine MD   ondansetron (ZOFRAN-ODT) 4 mg disintegrating tablet Take 1 tablet (4 mg total) by mouth every 6 (six) hours as needed for nausea or vomiting 6/11/21   Dominik Jasmine MD   sucralfate (CARAFATE) 1 g/10 mL suspension Take 10 mL (1 g total) by mouth 4 (four) times a day for 11 days 6/11/21 6/22/21  Dominik Jasmine MD     I have reviewed home medications with patient personally  Allergies: No Known Allergies    Social History:  Marital Status: /Civil Union     Substance Use History:   Social History     Substance and Sexual Activity   Alcohol Use Yes     Social History     Tobacco Use   Smoking Status Never Smoker   Smokeless Tobacco Never Used     Social History     Substance and Sexual Activity   Drug Use Never       Family History:  No family history on file      Physical Exam:     Vitals:   Blood Pressure: 152/91 (05/23/22 1505)  Pulse: 76 (05/23/22 1505)  Temperature: (!) 97 2 °F (36 2 °C) (05/23/22 1505)  Temp Source: Oral (05/23/22 1505)  Respirations: 17 (05/23/22 1505)  Height: 5' 7" (170 2 cm) (05/23/22 0713)  Weight - Scale: 93 kg (205 lb) (05/23/22 0713)  SpO2: 96 % (05/23/22 1505)    Physical Exam General- Awake, alert and oriented x 3, looks comfortable  HEENT- Normocephalic, atraumatic, oral mucosa- moist  Neck- Supple, No carotid bruit, no JVD  CVS- Normal S1/ S2, Regular rate and rhythm, No murmur, No edema  Respiratory system- B/L clear breath sounds, no wheezing  Abdomen- Soft, Non distended, no tenderness, Bowel sound- present 4 quads  Genitourinary- No suprapubic tenderness, No CVA tenderness  Skin- No new bruise or rash  Musculoskeletal- No gross deformity  Psych- No acute psychosis  CNS- CN II- XII grossly intact, No acute focal neurologic deficit noted      Additional Data:     Lab Results:  Results from last 7 days   Lab Units 05/23/22  0737   WBC Thousand/uL 8 13   HEMOGLOBIN g/dL 15 8   HEMATOCRIT % 46 6   PLATELETS Thousands/uL 238   NEUTROS PCT % 63   LYMPHS PCT % 27   MONOS PCT % 8   EOS PCT % 1     Results from last 7 days   Lab Units 05/23/22  0737   SODIUM mmol/L 139   POTASSIUM mmol/L 3 9   CHLORIDE mmol/L 102   CO2 mmol/L 26   BUN mg/dL 12   CREATININE mg/dL 1 17   ANION GAP mmol/L 11   CALCIUM mg/dL 9 1   ALBUMIN g/dL 3 8   TOTAL BILIRUBIN mg/dL 0 51   ALK PHOS U/L 98   ALT U/L 47   AST U/L 52*   GLUCOSE RANDOM mg/dL 140 Results from last 7 days   Lab Units 05/17/22  2210   INR  0 93     Results from last 7 days   Lab Units 05/23/22  0733   POC GLUCOSE mg/dl 118         Results from last 7 days   Lab Units 05/23/22  0918 05/23/22  0737   LACTIC ACID mmol/L 1 3 2 8*       Imaging: Reviewed radiology reports from this admission including: CT head  MRI brain w wo contrast   Final Result by Silver Contreras MD (05/23 1226)      1  No acute intracranial pathology  Unremarkable MRI of the brain  2   Extensive right sinus disease  Workstation performed: [id number]         XR chest 2 views   ED Interpretation by Adele Rivera DO (05/23 0703)   NAD      Final Result by Naveed Rizzo MD (05/23 7876)      Minimal scar or atelectasis at the left lung base  Workstation performed: [id number]         CT head without contrast   Final Result by Louis Wright MD (05/23 3969)      No acute intracranial abnormality  Stable appearing extensive sinus disease  Workstation performed: [id number]                 ** Please Note: This note has been constructed using a voice recognition system   **
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
## 2022-05-23 NOTE — QUICK NOTE
Pt received to [facility], extremely anxious, CIWA score 21, ativan given  Pt unable to answer admission at this point, so will complete admission later after pt calm down a little

## 2022-05-23 NOTE — ED PROVIDER NOTES
History  Chief Complaint   Patient presents with    Altered Mental Status     "Lost orientation yesterday" as per family; declining mental status x 1-2 wks, no unilateral weakness     Patient is a 79-year-old male past medical history of alcohol abuse presenting with altered mental status  Wife at bedside notes waxing waning orientation for the past 2 weeks and states that 3 days ago patient went missing and that they did not find him until the next day  States that they live in this area but work any organ the patient was lost to [place]  Patient was recently seen for similar complaints with negative workup in offered admission however declined at that time  Patient states that he is here for acting waning orientation but also notes panic  He did have an implant to treat alcohol abuse which he said was removed last week after initial visit  He has not drank alcohol in months and denies drug use  Denies any head or neck injuries recently  Denies any headache, fevers, abdominal pain, chest pain, nausea vomiting, vision changes, shortness of breath, rashes, dizziness  Denies taking any medications  Outside of room wife approaches me and states that patient began complaining last week of hearing voices but does not tell her what the voices tell him  She states that she has no history similar  Prior to Admission Medications   Prescriptions Last Dose Informant Patient Reported?  Taking?   famotidine (PEPCID) 20 mg tablet   No No   Sig: Take 1 tablet (20 mg total) by mouth 2 (two) times a day   omeprazole (PriLOSEC) 20 mg delayed release capsule   No No   Sig: Take 1 capsule (20 mg total) by mouth 2 (two) times a day for 14 days   ondansetron (ZOFRAN-ODT) 4 mg disintegrating tablet   No No   Sig: Take 1 tablet (4 mg total) by mouth every 6 (six) hours as needed for nausea or vomiting   sucralfate (CARAFATE) 1 g/10 mL suspension   No No   Sig: Take 10 mL (1 g total) by mouth 4 (four) times a day for 11 days      Facility-Administered Medications: None       Past Medical History:   Diagnosis Date    ETOH abuse        No past surgical history on file  No family history on file  I have reviewed and agree with the history as documented  E-Cigarette/Vaping    E-Cigarette Use Never User      E-Cigarette/Vaping Substances     Social History     Tobacco Use    Smoking status: Never Smoker    Smokeless tobacco: Never Used   Vaping Use    Vaping Use: Never used   Substance Use Topics    Alcohol use: Yes    Drug use: Never       Review of Systems   All other systems reviewed and are negative  Physical Exam  Physical Exam  Vitals reviewed  Constitutional:       General: He is not in acute distress  Appearance: Normal appearance  He is not ill-appearing  HENT:      Mouth/Throat:      Mouth: Mucous membranes are moist    Eyes:      Extraocular Movements: Extraocular movements intact  Conjunctiva/sclera: Conjunctivae normal       Pupils: Pupils are equal, round, and reactive to light  Cardiovascular:      Rate and Rhythm: Normal rate and regular rhythm  Heart sounds: Normal heart sounds  Pulmonary:      Effort: Pulmonary effort is normal       Breath sounds: Normal breath sounds  Abdominal:      General: Abdomen is flat  Palpations: Abdomen is soft  Tenderness: There is no abdominal tenderness  Musculoskeletal:         General: No swelling  Normal range of motion  Cervical back: Neck supple  Skin:     General: Skin is warm and dry  Neurological:      General: No focal deficit present  Mental Status: He is alert and oriented to person, place, and time  GCS: GCS eye subscore is 4  GCS verbal subscore is 5  Cranial Nerves: No cranial nerve deficit, dysarthria or facial asymmetry  Sensory: No sensory deficit  Motor: No weakness        Coordination: Coordination normal       Gait: Gait normal    Psychiatric:         Mood and Affect: Mood normal          Vital Signs  ED Triage Vitals [05/23/22 0713]   Temperature Pulse Respirations Blood Pressure SpO2   98 3 °F (36 8 °C) 99 18 (!) 174/94 99 %      Temp src Heart Rate Source Patient Position - Orthostatic VS BP Location FiO2 (%)   -- -- -- -- --      Pain Score       --           Vitals:    05/23/22 0713 05/23/22 0830 05/23/22 0900 05/23/22 1000   BP: (!) 174/94 138/85 138/85 139/89   Pulse: 99 85 87 88         Visual Acuity  Visual Acuity    Flowsheet Row Most Recent Value   L Pupil Size (mm) 3   R Pupil Size (mm) 3          ED Medications  Medications   famotidine (PEPCID) tablet 20 mg (20 mg Oral Given 5/23/22 1024)   ondansetron (ZOFRAN) injection 4 mg (has no administration in time range)   sodium chloride 0 9 % bolus 1,000 mL (0 mL Intravenous Stopped 5/23/22 1007)   LORazepam (ATIVAN) tablet 0 5 mg (0 5 mg Oral Given 5/23/22 0842)       Diagnostic Studies  Results Reviewed     Procedure Component Value Units Date/Time    HS Troponin I 2hr [[id number]]  (Normal) Collected: 05/23/22 0918    Lab Status: Final result Specimen: Blood from Arm, Right Updated: 05/23/22 0948     hs TnI 2hr 6 ng/L      Delta 2hr hsTnI 1 ng/L     Lactic acid 2 Hours [[id number]]  (Normal) Collected: 05/23/22 0918    Lab Status: Final result Specimen: Blood from Arm, Right Updated: 05/23/22 0946     LACTIC ACID 1 3 mmol/L     Narrative:      Result may be elevated if tourniquet was used during collection      UA w Reflex to Microscopic w Reflex to Culture [[id number]] Collected: 05/23/22 0750    Lab Status: Final result Specimen: Urine, Clean Catch Updated: 05/23/22 8125     Color, UA Yellow     Clarity, UA Clear     Specific Earp, UA 1 020     pH, UA 6 0     Leukocytes, UA Negative     Nitrite, UA Negative     Protein, UA Negative mg/dl      Glucose, UA Negative mg/dl      Ketones, UA Negative mg/dl      Urobilinogen, UA 0 2 E U /dl      Bilirubin, UA Negative     Blood, UA Negative    Lactic acid [[id number]]  (Abnormal) Collected: 05/23/22 0737    Lab Status: Final result Specimen: Blood from Arm, Right Updated: 05/23/22 0813     LACTIC ACID 2 8 mmol/L     Narrative:      Result may be elevated if tourniquet was used during collection  Rapid drug screen, urine [[id number]]  (Normal) Collected: 05/23/22 0749    Lab Status: Final result Specimen: Urine, Clean Catch Updated: 05/23/22 0813     Amph/Meth UR Negative     Barbiturate Ur Negative     Benzodiazepine Urine Negative     Cocaine Urine Negative     Methadone Urine Negative     Opiate Urine Negative     PCP Ur Negative     THC Urine Negative     Oxycodone Urine Negative    Narrative:      FOR MEDICAL PURPOSES ONLY  IF CONFIRMATION NEEDED PLEASE CONTACT THE LAB WITHIN 5 DAYS  Drug Screen Cutoff Levels:  AMPHETAMINE/METHAMPHETAMINES  1000 ng/mL  BARBITURATES     200 ng/mL  BENZODIAZEPINES     200 ng/mL  COCAINE      300 ng/mL  METHADONE      300 ng/mL  OPIATES      300 ng/mL  PHENCYCLIDINE     25 ng/mL  THC       50 ng/mL  OXYCODONE      100 ng/mL    Magnesium [[id number]]  (Normal) Collected: 05/23/22 0737    Lab Status: Final result Specimen: Blood from Arm, Right Updated: 05/23/22 0812     Magnesium 2 0 mg/dL     TSH [[id number]]  (Normal) Collected: 05/23/22 0737    Lab Status: Final result Specimen: Blood from Arm, Right Updated: 05/23/22 0812     TSH 3RD GENERATON 2 067 uIU/mL     Narrative:      Patients undergoing fluorescein dye angiography may retain small amounts of fluorescein in the body for 48-72 hours post procedure  Samples containing fluorescein can produce falsely depressed TSH values  If the patient had this procedure,a specimen should be resubmitted post fluorescein clearance        HS Troponin I 4hr [[id number]]     Lab Status: No result Specimen: Blood     HS Troponin 0hr (reflex protocol) [[id number]]  (Normal) Collected: 05/23/22 0737    Lab Status: Final result Specimen: Blood from Arm, Right Updated: 05/23/22 0810     hs TnI 0hr 5 ng/L     Comprehensive metabolic panel [[id number]]  (Abnormal) Collected: 05/23/22 0737    Lab Status: Final result Specimen: Blood from Arm, Right Updated: 05/23/22 0803     Sodium 139 mmol/L      Potassium 3 9 mmol/L      Chloride 102 mmol/L      CO2 26 mmol/L      ANION GAP 11 mmol/L      BUN 12 mg/dL      Creatinine 1 17 mg/dL      Glucose 140 mg/dL      Calcium 9 1 mg/dL      AST 52 U/L      ALT 47 U/L      Alkaline Phosphatase 98 U/L      Total Protein 7 6 g/dL      Albumin 3 8 g/dL      Total Bilirubin 0 51 mg/dL      eGFR 70 ml/min/1 73sq m     Narrative:      National Kidney Disease Foundation guidelines for Chronic Kidney Disease (CKD):     Stage 1 with normal or high GFR (GFR > 90 mL/min/1 73 square meters)    Stage 2 Mild CKD (GFR = 60-89 mL/min/1 73 square meters)    Stage 3A Moderate CKD (GFR = 45-59 mL/min/1 73 square meters)    Stage 3B Moderate CKD (GFR = 30-44 mL/min/1 73 square meters)    Stage 4 Severe CKD (GFR = 15-29 mL/min/1 73 square meters)    Stage 5 End Stage CKD (GFR <15 mL/min/1 73 square meters)  Note: GFR calculation is accurate only with a steady state creatinine    Ammonia [[id number]]  (Normal) Collected: 05/23/22 0737    Lab Status: Final result Specimen: Blood from Arm, Right Updated: 05/23/22 0801     Ammonia 18 umol/L     Ethanol [[id number]]  (Normal) Collected: 05/23/22 0737    Lab Status: Final result Specimen: Blood from Arm, Right Updated: 05/23/22 0800     Ethanol Lvl <3 mg/dL     CBC and differential [[id number]] Collected: 05/23/22 0737    Lab Status: Final result Specimen: Blood from Arm, Right Updated: 05/23/22 0745     WBC 8 13 Thousand/uL      RBC 5 33 Million/uL      Hemoglobin 15 8 g/dL      Hematocrit 46 6 %      MCV 87 fL      MCH 29 6 pg      MCHC 33 9 g/dL      RDW 13 2 %      MPV 11 1 fL      Platelets 643 Thousands/uL      nRBC 0 /100 WBCs      Neutrophils Relative 63 %      Immat GRANS % 0 %      Lymphocytes Relative 27 %      Monocytes Relative 8 %      Eosinophils Relative 1 %      Basophils Relative 1 %      Neutrophils Absolute 5 21 Thousands/µL      Immature Grans Absolute 0 03 Thousand/uL      Lymphocytes Absolute 2 16 Thousands/µL      Monocytes Absolute 0 61 Thousand/µL      Eosinophils Absolute 0 08 Thousand/µL      Basophils Absolute 0 04 Thousands/µL     Fingerstick Glucose (POCT) [[id number]]  (Normal) Collected: 05/23/22 0733    Lab Status: Final result Updated: 05/23/22 0735     POC Glucose 118 mg/dl                  XR chest 2 views   ED Interpretation by Marta Rocha DO (05/23 6240)   NAD      Final Result by Brendan Severino MD (05/23 0612)      Minimal scar or atelectasis at the left lung base  Workstation performed: [id number]         CT head without contrast   Final Result by Marlon Welch MD (05/23 0633)      No acute intracranial abnormality  Stable appearing extensive sinus disease  Workstation performed: [id number]         MRI inpatient order    (Results Pending)              Procedures  ECG 12 Lead Documentation Only    Date/Time: 5/23/2022 7:53 AM  Performed by: Marta Rocha DO  Authorized by: Marta Rocha DO     ECG reviewed by me, the ED Provider: yes    Patient location:  ED  Previous ECG:     Previous ECG:  Compared to current    Similarity:  No change  Interpretation:     Interpretation: normal    Rate:     ECG rate assessment: normal    Rhythm:     Rhythm: sinus rhythm    Ectopy:     Ectopy: none    QRS:     QRS axis:  Normal    QRS intervals:  Normal  Conduction:     Conduction: normal    ST segments:     ST segments:  Normal  T waves:     T waves: normal               ED Course  ED Course as of 05/23/22 1035   Mon May 23, 2022   0756 Patient denies SI, HI but does admit to hearing voices with wife in the room, denies that they tell him to hurt himself or anyone else however is vague and states they tell him "different things"     0840 Patient willing to be admitted to the hospital, workup unremarkable with the exception of lactic acidosis however have discussed with neurology who also recommends admission  [id number] Patient also noting anxiety, will give p o  Ativan                               SBIRT 22yo+    Flowsheet Row Most Recent Value   SBIRT (23 yo +)    In order to provide better care to our patients, we are screening all of our patients for alcohol and drug use  Would it be okay to ask you these screening questions? Yes Filed at: 05/23/2022 6718   Initial Alcohol Screen: US AUDIT-C     1  How often do you have a drink containing alcohol? 0 Filed at: 05/23/2022 0748   2  How many drinks containing alcohol do you have on a typical day you are drinking? 0 Filed at: 05/23/2022 0748   3a  Male UNDER 65: How often do you have five or more drinks on one occasion? 0 Filed at: 05/23/2022 0748   3b  FEMALE Any Age, or MALE 65+: How often do you have 4 or more drinks on one occassion? 0 Filed at: 05/23/2022 0748   Audit-C Score 0 Filed at: 05/23/2022 0124   ANUSHKA: How many times in the past year have you    Used an illegal drug or used a prescription medication for non-medical reasons? Never Filed at: 05/23/2022 9844                    MDM  Number of Diagnoses or Management Options  Diagnosis management comments: Patient is a 50-year-old male past medical history of alcohol abuse presenting with waxing waning mental status  Patient is well-appearing bedside with stable vitals though currently hypertensive to 174/94 however with no focal neurologic deficits, currently oriented, and not complaining of any pain  Will give fluids as patient is mildly tachycardic, continue to monitor, obtain altered mental status workup discussed with patient if he is willing to see crisis as this may be psychiatric in nature if no other medical causes found  Disposition  Final diagnoses:    Altered mental status   Lactic acidosis     Time reflects when diagnosis was documented in both MDM as applicable and the Disposition within this note     Time User Action Codes Description Comment    5/23/2022  8:47 AM Kristan Adam [R41 82] Altered mental status     5/23/2022  8:47 AM Kristan Landis Jair [E87 2] Lactic acidosis       ED Disposition     ED Disposition   Admit    Condition   Stable    Date/Time   Mon May 23, 2022  8:47 AM    Comment   Case was discussed with Dr Tyree Ortiz and the patient's admission status was agreed to be Admission Status: observation status to the service of Dr Tyree Ortiz   Follow-up Information    None         Patient's Medications   Discharge Prescriptions    No medications on file       No discharge procedures on file      PDMP Review     None          ED Provider  Electronically Signed by           Keven Mao DO  05/23/22 4224

## 2022-05-23 NOTE — ASSESSMENT & PLAN NOTE
47 y o  male former smoker with HTN, HLD, CAD, and EtOH abuse (reportedly quit in 12/2022 with aid of disulfiram implant, drank a couple beers about 1 5 wks ago, and implant was subsequently removed), presented to ED 5/23/2022 with intermittent confusion for the last 2 weeks  He recently went missing for 26 hours and reportedly seemed to be normal when he returned but had no recollection of the previous 26 hours    Unclear etiology of his intermittent AMS  Contributing factors include anxiety/stress and lack of sleep  Other considerations include relapse of his alcoholism, and seizures that may or may not be related to EtOH  Plan   - CTH and MRI brain with and without contrast revealed no acute findings  - routine EEG pending   - Pertinenet labs: R51 261, Folic acid 41 3, RPR neg, Ammonia 18, UA negative, ethanol less than 3, rapid urine drug screen negative  - B1 and methylmalonic acid pending  - supplement vitamin B12 with 1000mcg im x1 now followed by 1000mcg po daily starting 5/25  - can continue outpatient B12 1000mcg injections q30 days for 6 months then re-check level  - Psychiatry consult pending  - Avoid CNS altering medications if possible  - Continue to monitor neurologic status   Notify neurology with any changes in exam    - Medical management and correction of metabolic and infectious disturbances per primary team

## 2022-05-23 NOTE — ASSESSMENT & PLAN NOTE
· Pt with h/o alcoholism whose last drink was on 5/11 and 5/12 - couple beers  · Was found to be very confused and missing a few days ago and found at his work place in [place] when he lives here in [place]  · His family brought him to ER for further workup  · He was on disulfiram implant which has been removed after he drank beer while he had the implant  · As per family his symptoms were present before then  · ER spoke with neuro who recommended MRI, CT head was unremarkable  · Pt is not willing to stay but he is due to family pressure  · Neuro consult pending

## 2022-05-23 NOTE — CASE MANAGEMENT
Case Management Assessment & Discharge Planning Note    Patient name Tabatha Cummings  Location ED 11/ED 11 MRN [id number]  : 1970 Date 2022       Current Admission Date: 2022  Current Admission Diagnosis:Altered mental status  Patient Active Problem List    Diagnosis Date Noted    Altered mental status 2022      LOS (days): 0  Geometric Mean LOS (GMLOS) (days):   Days to GMLOS:     OBJECTIVE:              Current admission status: Observation       Preferred Pharmacy:   [address] [place], [place] [id number] R R 1 ([phone] R R 1 ([address])  [address] [place] [address]  Phone: [phone] Fax: [phone]    Primary Care Provider: No primary care provider on file  Primary Insurance: BLUE CROSS  Secondary Insurance:     ASSESSMENT:  [address] [place] [address], [address] [place] [address] Representative - Spouse   Primary Phone: [phone] (Mobile)               Advance Directives  Does patient have a [facility]?: No  Was patient offered paperwork?: Yes (Patient's daughter declined paperwork at this time )  Does patient currently have a Health Care decision maker?: Yes, please see Health Care Proxy section  Does patient have Advance Directives?: No  Was patient offered paperwork?: Yes (Patient's daughter declined )  Primary Contact: Patient's Daughter Anali Or    Readmission Root Cause  30 Day Readmission: No    Patient Information  Admitted from[de-identified] Home  Mental Status: Confused  During Assessment patient was accompanied by: Not accompanied during assessment  Assessment information provided by[de-identified] Daughter (Per patient's wife, she is unable to speak [place] and asked CM to call her daughter )  Primary Caregiver: Self  Support Systems: Family members, Spouse/significant other, Melany [address] of Residence: [place] [address] do you live in?: [employer] [address] entry access options   Select all that apply : No steps to enter home  Type of Current Residence: 2 story home  Upon entering residence, is there a bedroom on the main floor (no further steps)?: Yes  Upon entering residence, is there a bathroom on the main floor (no further steps)?: Yes  In the last 12 months, was there a time when you were not able to pay the mortgage or rent on time?: No  In the last 12 months, how many places have you lived?: 1  In the last 12 months, was there a time when you did not have a steady place to sleep or slept in a shelter (including now)?: No  Homeless/housing insecurity resource given?: N/A  Living Arrangements: Lives w/ Spouse/significant other  Is patient a ?: No    Activities of Daily Living Prior to Admission  Functional Status: Independent  Completes ADLs independently?: Yes  Ambulates independently?: Yes  Does patient use assisted devices?: No  Does patient currently own DME?: No  Does patient have a history of Outpatient Therapy (PT/OT)?: No  Does the patient have a history of Short-Term Rehab?: No  Does patient have a history of HHC?: No  Does patient currently have Sound Clips?: No    Patient Information Continued  Income Source: Employed  Does patient have prescription coverage?: Yes  Within the past 12 months, you worried that your food would run out before you got the money to buy more : Never true  Within the past 12 months, the food you bought just didn't last and you didn't have money to get more : Never true  Food insecurity resource given?: N/A  Does patient receive dialysis treatments?: No  Does patient have a history of substance abuse?: Yes  Historical substance use preference: Alcohol/ETOH (Patient's daughter reported that patient has not had a drink since December )  History of Withdrawal Symptoms: Denies past symptoms  Is patient currently in treatment for substance abuse?: Yes  Does patient have a history of Mental Health Diagnosis?: Yes (anxiety)  Is patient receiving treatment for mental health?: No  Treatment options were provided    Has patient received inpatient treatment related to mental health in the last 2 years?: No    Means of Transportation  Means of Transport to Appts[de-identified] Drives Self  In the past 12 months, has lack of transportation kept you from medical appointments or from getting medications?: No  In the past 12 months, has lack of transportation kept you from meetings, work, or from getting things needed for daily living?: No  Was application for public transport provided?: N/A    DISCHARGE DETAILS:    Discharge planning discussed with[de-identified] Patient's Daughter  Freedom of Choice: Yes  Comments - Freedom of Choice: CM discussed freedom of choice as it pertains to discharge planning  Patient's daughter reported that patient would likely benefit from D&A rehab and OP MH treatment  CM to review with patient once mentation improves    CM contacted family/caregiver?: Yes  Were Treatment Team discharge recommendations reviewed with patient/caregiver?: Yes  Did patient/caregiver verbalize understanding of patient care needs?: Yes  Were patient/caregiver advised of the risks associated with not following Treatment Team discharge recommendations?: Yes    Contacts  Patient Contacts: Georgette Farr  Relationship to Patient[de-identified] Family  Contact Method: Phone  Phone Number: [phone]  Reason/Outcome: Continuity of Care, Discharge [address]         Is the patient interested in [facility] at discharge?: No    DME Referral Provided  Referral made for DME?: No    Would you like to participate in our [address] service program?  : No - Declined    Treatment Team Recommendation: Home  Discharge Destination Plan[de-identified] Home  Transport at Discharge : Family

## 2022-05-24 ENCOUNTER — APPOINTMENT (OUTPATIENT)
Dept: NEUROLOGY | Facility: HOSPITAL | Age: 52
End: 2022-05-24
Payer: COMMERCIAL

## 2022-05-24 LAB
ALBUMIN SERPL BCP-MCNC: 3.5 G/DL (ref 3.5–5)
ALP SERPL-CCNC: 96 U/L (ref 46–116)
ALT SERPL W P-5'-P-CCNC: 43 U/L (ref 12–78)
ANION GAP SERPL CALCULATED.3IONS-SCNC: 9 MMOL/L (ref 4–13)
AST SERPL W P-5'-P-CCNC: 34 U/L (ref 5–45)
BASOPHILS # BLD AUTO: 0.04 THOUSANDS/ΜL (ref 0–0.1)
BASOPHILS NFR BLD AUTO: 0 % (ref 0–1)
BILIRUB SERPL-MCNC: 0.43 MG/DL (ref 0.2–1)
BUN SERPL-MCNC: 17 MG/DL (ref 5–25)
CALCIUM SERPL-MCNC: 9 MG/DL (ref 8.3–10.1)
CHLORIDE SERPL-SCNC: 104 MMOL/L (ref 100–108)
CO2 SERPL-SCNC: 27 MMOL/L (ref 21–32)
CREAT SERPL-MCNC: 1.17 MG/DL (ref 0.6–1.3)
EOSINOPHIL # BLD AUTO: 0.12 THOUSAND/ΜL (ref 0–0.61)
EOSINOPHIL NFR BLD AUTO: 1 % (ref 0–6)
ERYTHROCYTE [DISTWIDTH] IN BLOOD BY AUTOMATED COUNT: 13.3 % (ref 11.6–15.1)
GFR SERPL CREATININE-BSD FRML MDRD: 70 ML/MIN/1.73SQ M
GLUCOSE SERPL-MCNC: 116 MG/DL (ref 65–140)
HCT VFR BLD AUTO: 45.9 % (ref 36.5–49.3)
HGB BLD-MCNC: 15.3 G/DL (ref 12–17)
IMM GRANULOCYTES # BLD AUTO: 0.03 THOUSAND/UL (ref 0–0.2)
IMM GRANULOCYTES NFR BLD AUTO: 0 % (ref 0–2)
LYMPHOCYTES # BLD AUTO: 2.22 THOUSANDS/ΜL (ref 0.6–4.47)
LYMPHOCYTES NFR BLD AUTO: 25 % (ref 14–44)
MCH RBC QN AUTO: 29.5 PG (ref 26.8–34.3)
MCHC RBC AUTO-ENTMCNC: 33.3 G/DL (ref 31.4–37.4)
MCV RBC AUTO: 88 FL (ref 82–98)
MONOCYTES # BLD AUTO: 0.61 THOUSAND/ΜL (ref 0.17–1.22)
MONOCYTES NFR BLD AUTO: 7 % (ref 4–12)
NEUTROPHILS # BLD AUTO: 6.05 THOUSANDS/ΜL (ref 1.85–7.62)
NEUTS SEG NFR BLD AUTO: 67 % (ref 43–75)
NRBC BLD AUTO-RTO: 0 /100 WBCS
PLATELET # BLD AUTO: 227 THOUSANDS/UL (ref 149–390)
PMV BLD AUTO: 10.6 FL (ref 8.9–12.7)
POTASSIUM SERPL-SCNC: 4.2 MMOL/L (ref 3.5–5.3)
PROT SERPL-MCNC: 7.1 G/DL (ref 6.4–8.2)
RBC # BLD AUTO: 5.19 MILLION/UL (ref 3.88–5.62)
RPR SER QL: NORMAL
SODIUM SERPL-SCNC: 140 MMOL/L (ref 136–145)
WBC # BLD AUTO: 9.07 THOUSAND/UL (ref 4.31–10.16)

## 2022-05-24 PROCEDURE — 99417 PROLNG OP E/M EACH 15 MIN: CPT | Performed by: PSYCHIATRY & NEUROLOGY

## 2022-05-24 PROCEDURE — 99225 PR SBSQ OBSERVATION CARE/DAY 25 MINUTES: CPT | Performed by: NURSE PRACTITIONER

## 2022-05-24 PROCEDURE — 85025 COMPLETE CBC W/AUTO DIFF WBC: CPT | Performed by: FAMILY MEDICINE

## 2022-05-24 PROCEDURE — 99215 OFFICE O/P EST HI 40 MIN: CPT | Performed by: PSYCHIATRY & NEUROLOGY

## 2022-05-24 PROCEDURE — 80053 COMPREHEN METABOLIC PANEL: CPT | Performed by: FAMILY MEDICINE

## 2022-05-24 PROCEDURE — 95816 EEG AWAKE AND DROWSY: CPT

## 2022-05-24 PROCEDURE — 99204 OFFICE O/P NEW MOD 45 MIN: CPT | Performed by: PSYCHIATRY & NEUROLOGY

## 2022-05-24 RX ORDER — HALOPERIDOL 5 MG/ML
1 INJECTION INTRAMUSCULAR ONCE
Status: COMPLETED | OUTPATIENT
Start: 2022-05-24 | End: 2022-05-24

## 2022-05-24 RX ORDER — LORAZEPAM 1 MG/1
2 TABLET ORAL ONCE
Status: COMPLETED | OUTPATIENT
Start: 2022-05-24 | End: 2022-05-24

## 2022-05-24 RX ORDER — MIRTAZAPINE 15 MG/1
7.5 TABLET, FILM COATED ORAL
Status: DISCONTINUED | OUTPATIENT
Start: 2022-05-24 | End: 2022-05-25 | Stop reason: HOSPADM

## 2022-05-24 RX ORDER — QUETIAPINE FUMARATE 25 MG/1
25 TABLET, FILM COATED ORAL
Status: DISCONTINUED | OUTPATIENT
Start: 2022-05-24 | End: 2022-05-24

## 2022-05-24 RX ORDER — CYANOCOBALAMIN 1000 UG/ML
1000 INJECTION INTRAMUSCULAR; SUBCUTANEOUS ONCE
Status: COMPLETED | OUTPATIENT
Start: 2022-05-24 | End: 2022-05-24

## 2022-05-24 RX ORDER — LORAZEPAM 2 MG/ML
1 INJECTION INTRAMUSCULAR ONCE
Status: COMPLETED | OUTPATIENT
Start: 2022-05-24 | End: 2022-05-24

## 2022-05-24 RX ORDER — NALTREXONE HYDROCHLORIDE 50 MG/1
25 TABLET, FILM COATED ORAL DAILY
Status: DISCONTINUED | OUTPATIENT
Start: 2022-05-24 | End: 2022-05-25 | Stop reason: HOSPADM

## 2022-05-24 RX ADMIN — MIRTAZAPINE 7.5 MG: 15 TABLET, FILM COATED ORAL at 21:31

## 2022-05-24 RX ADMIN — FAMOTIDINE 20 MG: 20 TABLET ORAL at 17:45

## 2022-05-24 RX ADMIN — THIAMINE HCL TAB 100 MG 100 MG: 100 TAB at 10:36

## 2022-05-24 RX ADMIN — LORAZEPAM 2 MG: 1 TABLET ORAL at 21:49

## 2022-05-24 RX ADMIN — HALOPERIDOL LACTATE 1 MG: 5 INJECTION, SOLUTION INTRAMUSCULAR at 19:55

## 2022-05-24 RX ADMIN — NALTREXONE HYDROCHLORIDE 25 MG: 50 TABLET, FILM COATED ORAL at 12:34

## 2022-05-24 RX ADMIN — FAMOTIDINE 20 MG: 20 TABLET ORAL at 10:36

## 2022-05-24 RX ADMIN — CYANOCOBALAMIN 1000 MCG: 1000 INJECTION, SOLUTION INTRAMUSCULAR; SUBCUTANEOUS at 10:36

## 2022-05-24 RX ADMIN — LORAZEPAM 1 MG: 2 INJECTION INTRAMUSCULAR; INTRAVENOUS at 19:55
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
## 2022-05-24 NOTE — CASE MANAGEMENT
Case Management Discharge Planning Note    Patient name Judy Orozco  Location [facility] [id number] MRN [id number]  : 1970 Date 2022       Current Admission Date: 2022  Current Admission Diagnosis:Altered mental status   Patient Active Problem List    Diagnosis Date Noted    Altered mental status 2022      LOS (days): 0  Geometric Mean LOS (GMLOS) (days):   Days to GMLOS:     OBJECTIVE:            Current admission status: Observation   Preferred Pharmacy:   [address] [place] [address] 9293 R R 1 ([phone] R R 1 ([address])  [address] [place] [address]  Phone: [phone] Fax: [phone]    Primary Care Provider: No primary care provider on file  Primary Insurance: BLUE CROSS  Secondary Insurance:     DISCHARGE DETAILS:    Discharge planning discussed with[de-identified] patient and spouse at bedside  Freedom of Choice: Yes  Comments - Freedom of Choice: As per SLIM, patient is tentative for dc today pending EEG results  Psychiatry saw patient and cleared him for dc home with OP f/u; ambulatory referral for behavioral health placed in EPIC  CM met with patient and spouse at bedside to introduce self/role  Patient appears receptive to both [facility] and D&A treatment resources  CM let him know that referral was placed for behavioral health f/u and someone from  should reach out to coordinate same  If patient does not hear from them, CM provided OP [facility] resources list for [facility]  CM also provided OP D&A treatment list and offered to make warm hand off to [facility] and patient was appreciative of same  CM called Rachele at [facility] at [phone] and she will work on scheduling an intake appt for patient  She's going to assume he discharges today but asked that CM call and let her know if he's still inpatient tomorrow    CM contacted family/caregiver?: Yes  Were Treatment Team discharge recommendations reviewed with patient/caregiver?: Yes  Did patient/caregiver verbalize understanding of patient care needs?: Yes  Were patient/caregiver advised of the risks associated with not following Treatment Team discharge recommendations?: Yes    Contacts  Patient Contacts: Kelli Lundberg  Contact Method: In Person  Reason/Outcome: Continuity of Care, Referral, Discharge Planning    Requested [address]         Is the patient interested in [place] [address] at discharge?: No    DME Referral Provided  Referral made for DME?: No    Other Referral/Resources/Interventions Provided:  Interventions: D&A Warm Handoff, Therapist  Referral Comments: CM called [facility] and spoke with Rachele to provide warm hand off  CM also provided OP MH and D&A treatment location resources       Treatment Team Recommendation: Home  Discharge Destination Plan[de-identified] Home (w/ OP D&A and MH treatment)  Transport at Discharge : Family

## 2022-05-24 NOTE — PLAN OF CARE
Problem: PAIN - ADULT  Goal: Verbalizes/displays adequate comfort level or baseline comfort level  Description: Interventions:  - Encourage patient to monitor pain and request assistance  - Assess pain using appropriate pain scale  - Administer analgesics based on type and severity of pain and evaluate response  - Implement non-pharmacological measures as appropriate and evaluate response  - Consider cultural and social influences on pain and pain management  - Notify physician/advanced practitioner if interventions unsuccessful or patient reports new pain  Outcome: Progressing     Problem: SAFETY ADULT  Goal: Patient will remain free of falls  Description: INTERVENTIONS:  - Educate patient/family on patient safety including physical limitations  - Instruct patient to call for assistance with activity   - Consult OT/PT to assist with strengthening/mobility   - Keep Call bell within reach  - Keep bed low and locked with side rails adjusted as appropriate  - Keep care items and personal belongings within reach  - Initiate and maintain comfort rounds  - Make Fall Risk Sign visible to staff  - Initiate/Maintain bed/chair alarm  - Apply yellow socks and bracelet for high fall risk patients  - Consider moving patient to room near nurses station  Outcome: Progressing     Problem: DISCHARGE PLANNING  Goal: Discharge to home or other facility with appropriate resources  Description: INTERVENTIONS:  - Identify barriers to discharge w/patient and caregiver  - Arrange for needed discharge resources and transportation as appropriate  - Identify discharge learning needs (meds, wound care, etc )  - Arrange for interpretive services to assist at discharge as needed  - Refer to Case Management Department for coordinating discharge planning if the patient needs post-hospital services based on physician/advanced practitioner order or complex needs related to functional status, cognitive ability, or social support system  Outcome: Progressing     Problem: Knowledge Deficit  Goal: Patient/family/caregiver demonstrates understanding of disease process, treatment plan, medications, and discharge instructions  Description: Complete learning assessment and assess knowledge base  Interventions:  - Provide teaching at level of understanding  - Provide teaching via preferred learning methods  Outcome: Progressing     Problem: NEUROSENSORY - ADULT  Goal: Achieves maximal functionality and self care  Description: INTERVENTIONS  - Monitor swallowing and airway patency with patient fatigue and changes in neurological status  - Encourage and assist patient to increase activity and self care     - Encourage visually impaired, hearing impaired and aphasic patients to use assistive/communication devices  Outcome: Progressing     Problem: METABOLIC, FLUID AND ELECTROLYTES - ADULT  Goal: Electrolytes maintained within normal limits  Description: INTERVENTIONS:  - Monitor labs and assess patient for signs and symptoms of electrolyte imbalances  - Administer electrolyte replacement as ordered  - Monitor response to electrolyte replacements, including repeat lab results as appropriate  - Instruct patient on fluid and nutrition as appropriate  Outcome: Progressing  Goal: Fluid balance maintained  Description: INTERVENTIONS:  - Monitor labs   - Monitor I/O and WT  - Instruct patient on fluid and nutrition as appropriate  - Assess for signs & symptoms of volume excess or deficit  Outcome: Progressing

## 2022-05-24 NOTE — ASSESSMENT & PLAN NOTE
· Pt with h/o alcoholism whose last drink was on 5/11 and 5/12 - couple beers  · Was found to be very confused and missing a few days ago and found at his work place in [place] when he lives here in [place]  · His family brought him to ER for further workup  · He was on disulfiram implant which has been removed after he drank beer while he had the implant  · ER spoke with neuro who recommended MRI,  · MRI brain- No acute intracranial pathology   Unremarkable MRI of the brain  · CT head was unremarkable  · Pt is not willing to stay but he is due to family pressure  · Psych consulted  · Started on Remeron for sleep/mood  · Started on naltrexone for alcohol cravings can follow up outpatient with PCP and psych for titration  · Cleared to discharge from their standpoint  · Neuro consulted  · contributing factors seem to clearly include anxiety/stress and lack of sleep    Other possible factors include relapse of his alcoholism, and seizures that may or may not be related to EtOH   · routine EEG pending   · Pertinenet labs: B36 149, Folic acid 54 2, RPR neg, Ammonia 18, UA negative, ethanol less than 3, rapid urine drug screen negative  · B1 and methylmalonic acid pending  · supplement vitamin B12 with 1000mcg im x1 now followed by 1000mcg po daily starting 5/25  · can continue outpatient B12 1000mcg injections q30 days for 6 months then re-check level

## 2022-05-24 NOTE — PLAN OF CARE
Problem: Potential for Falls  Goal: Patient will remain free of falls  Description: INTERVENTIONS:  - Educate patient/family on patient safety including physical limitations  - Instruct patient to call for assistance with activity   - Consult OT/PT to assist with strengthening/mobility   - Keep Call bell within reach  - Keep bed low and locked with side rails adjusted as appropriate  - Keep care items and personal belongings within reach  - Initiate and maintain comfort rounds  - Make Fall Risk Sign visible to staff  - Offer Toileting every 2 Hours, in advance of need  - Initiate/Maintain alarm  - Obtain necessary fall risk management equipment  - Apply yellow socks and bracelet for high fall risk patients  - Consider moving patient to room near nurses station  Outcome: Progressing     Problem: PAIN - ADULT  Goal: Verbalizes/displays adequate comfort level or baseline comfort level  Description: Interventions:  - Encourage patient to monitor pain and request assistance  - Assess pain using appropriate pain scale  - Administer analgesics based on type and severity of pain and evaluate response  - Implement non-pharmacological measures as appropriate and evaluate response  - Consider cultural and social influences on pain and pain management  - Notify physician/advanced practitioner if interventions unsuccessful or patient reports new pain  Outcome: Progressing     Problem: SAFETY ADULT  Goal: Patient will remain free of falls  Description: INTERVENTIONS:  - Educate patient/family on patient safety including physical limitations  - Instruct patient to call for assistance with activity   - Consult OT/PT to assist with strengthening/mobility   - Keep Call bell within reach  - Keep bed low and locked with side rails adjusted as appropriate  - Keep care items and personal belongings within reach  - Initiate and maintain comfort rounds  - Make Fall Risk Sign visible to staff  - Offer Toileting every 2 Hours, in advance of need  - Initiate/Maintain alarm  - Obtain necessary fall risk management equipment  - Apply yellow socks and bracelet for high fall risk patients  - Consider moving patient to room near nurses station  Outcome: Progressing  Goal: Maintain or return to baseline ADL function  Description: INTERVENTIONS:  -  Assess patient's ability to carry out ADLs; assess patient's baseline for ADL function and identify physical deficits which impact ability to perform ADLs (bathing, care of mouth/teeth, toileting, grooming, dressing, etc )  - Assess/evaluate cause of self-care deficits   - Assess range of motion  - Assess patient's mobility; develop plan if impaired  - Assess patient's need for assistive devices and provide as appropriate  - Encourage maximum independence but intervene and supervise when necessary  - Involve family in performance of ADLs  - Assess for home care needs following discharge   - Consider OT consult to assist with ADL evaluation and planning for discharge  - Provide patient education as appropriate  Outcome: Progressing

## 2022-05-24 NOTE — QUICK NOTE
Patient's behavior today was controled, however approximately 715 this evening patient became increasingly anxious and agitated and was attempting to lose hospital again  One time dose of IV Ativan was ordered however patient removed IV  He seems to be redirectable at this time will attempt to replace IV and give Ativan  One time dose of IM Haldol was given just in case  Will order neuropsych for competency evaluation in a m     Give Remeron as soon as patient is calmer

## 2022-05-24 NOTE — PROGRESS NOTES
[facility]  Progress Note - Carlos Roman Fus 1970, 47 y o  male MRN: [id number]  Unit/Bed#: -[place] Encounter: [id number]  Primary Care Provider: No primary care provider on file  Date and time admitted to hospital: 5/23/2022  7:09 AM    * Altered mental status  Assessment & Plan  · Pt with h/o alcoholism whose last drink was on 5/11 and 5/12 - couple beers  · Was found to be very confused and missing a few days ago and found at his work place in [place] when he lives here in [place]  · His family brought him to ER for further workup  · He was on disulfiram implant which has been removed after he drank beer while he had the implant  · ER spoke with neuro who recommended MRI,  · MRI brain- No acute intracranial pathology   Unremarkable MRI of the brain  · CT head was unremarkable  · Pt is not willing to stay but he is due to family pressure  · Psych consulted  · Started on Remeron for sleep/mood  · Started on naltrexone for alcohol cravings can follow up outpatient with PCP and psych for titration  · Cleared to discharge from their standpoint  · Neuro consulted  · contributing factors seem to clearly include anxiety/stress and lack of sleep  Other possible factors include relapse of his alcoholism, and seizures that may or may not be related to EtOH   · routine EEG pending   · Pertinenet labs: N64 196, Folic acid 71 9, RPR neg, Ammonia 18, UA negative, ethanol less than 3, rapid urine drug screen negative  · B1 and methylmalonic acid pending  · supplement vitamin B12 with 1000mcg im x1 now followed by 1000mcg po daily starting 5/25  · can continue outpatient B12 1000mcg injections q30 days for 6 months then re-check level        VTE Pharmacologic Prophylaxis: VTE Score: 2 Low Risk (Score 0-2) - Encourage Ambulation  Patient Centered Rounds: I performed bedside rounds with nursing staff today    Discussions with Specialists or Other Care Team Provider: reviewed notes talked to psyc    Education and Discussions with Family / Patient: patient and family    Time Spent for Care: 20 minutes  More than 50% of total time spent on counseling and coordination of care as described above  Current Length of Stay: 0 day(s)  Current Patient Status: Observation   Certification Statement: The patient will continue to require additional inpatient hospital stay due to EEG  Discharge Plan: Anticipate discharge tomorrow to home  Code Status: Level 1 - Full Code    Subjective:    Pt denies any SOB, difficult breathing, chest pain or tightness  Pt denies any nausea, vomiting, diarrhea or difficulty breathing  feeels better    Objective:     Vitals:   Temp (24hrs), Av 3 °F (36 8 °C), Min:97 9 °F (36 6 °C), Max:98 8 °F (37 1 °C)    Temp:  [97 9 °F (36 6 °C)-98 8 °F (37 1 °C)] 98 2 °F (36 8 °C)  HR:  [] 93  Resp:  [16-19] 19  BP: (110-144)/(60-97) 144/84  SpO2:  [92 %-97 %] 94 %  Body mass index is 32 11 kg/m²  Input and Output Summary (last 24 hours): Intake/Output Summary (Last 24 hours) at 2022 1832  Last data filed at 2022 0846  Gross per 24 hour   Intake 360 ml   Output --   Net 360 ml       Physical Exam:   Physical Exam  Constitutional:       General: He is not in acute distress  Appearance: He is not ill-appearing  Cardiovascular:      Rate and Rhythm: Normal rate  Pulmonary:      Effort: Pulmonary effort is normal    Abdominal:      Palpations: Abdomen is soft  Skin:     General: Skin is warm  Neurological:      Mental Status: He is alert     Psychiatric:         Mood and Affect: Mood normal           Additional Data:     Labs:  Results from last 7 days   Lab Units 22  0453   WBC Thousand/uL 9 07   HEMOGLOBIN g/dL 15 3   HEMATOCRIT % 45 9   PLATELETS Thousands/uL 227   NEUTROS PCT % 67   LYMPHS PCT % 25   MONOS PCT % 7   EOS PCT % 1     Results from last 7 days   Lab Units 22  0453   SODIUM mmol/L 140   POTASSIUM mmol/L 4 2   CHLORIDE mmol/L 104 CO2 mmol/L 27   BUN mg/dL 17   CREATININE mg/dL 1 17   ANION GAP mmol/L 9   CALCIUM mg/dL 9 0   ALBUMIN g/dL 3 5   TOTAL BILIRUBIN mg/dL 0 43   ALK PHOS U/L 96   ALT U/L 43   AST U/L 34   GLUCOSE RANDOM mg/dL 116     Results from last 7 days   Lab Units 05/17/22  2210   INR  0 93     Results from last 7 days   Lab Units 05/23/22  1756 05/23/22  0733   POC GLUCOSE mg/dl 125 118         Results from last 7 days   Lab Units 05/23/22  0918 05/23/22  0737   LACTIC ACID mmol/L 1 3 2 8*       Lines/Drains:  Invasive Devices  Report    Peripheral Intravenous Line  Duration           Peripheral IV 05/23/22 Right Antecubital 1 day                  Imaging: No pertinent imaging reviewed  Recent Cultures (last 7 days):         Last 24 Hours Medication List:   Current Facility-Administered Medications   Medication Dose Route Frequency Provider Last Rate    [START ON 5/25/2022] cyanocobalamin  1,000 mcg Oral Daily CHELITA Meza      famotidine  20 mg Oral BID Pallavi Jenkins MD      mirtazapine  7 5 mg Oral HS Isidro Rocha,       naltrexone  25 mg Oral Daily Isidro Rocha,       ondansetron  4 mg Intravenous Q6H PRN Pallavi Jenkins MD      thiamine  100 mg Oral Daily Henrietta Ashley PA-C          Today, Patient Was Seen By: CHELITA Azul    **Please Note: This note may have been constructed using a voice recognition system  **

## 2022-05-24 NOTE — PATIENT SAFETY
Progress Note - Disclosure   Sheela Cook 47 y o  male MRN: [id number]  Unit/Bed#: -01 Encounter: [id number]  Patient got up to use rest room  Not steady on feet  leans side to side and back and front while going to the bathroom  Assisted with patient with help from wife

## 2022-05-24 NOTE — UTILIZATION REVIEW
Initial Clinical Review    Admission: Date/Time/Statement:   Admission Orders (From admission, onward)     Ordered        05/23/22 0847  Place in Observation  Once                      Orders Placed This Encounter   Procedures    Place in Observation     Standing Status:   Standing     Number of Occurrences:   1     Order Specific Question:   Level of Care     Answer:   Med Surg [16]     ED Arrival Information     Expected   -    Arrival   5/23/2022 07:06    Acuity   Urgent            Means of arrival   Walk-In    Escorted by   Spouse    Service   Hospitalist    Admission type   Urgent            Arrival complaint   Altered Mental Status           Chief Complaint   Patient presents with    Altered Mental Status     "Lost orientation yesterday" as per family; declining mental status x 1-2 wks, no unilateral weakness       Initial Presentation: 47 y o  male urgently to ED as Observation admission for evaluation & treatment of AMS  PMH  Alcoholism w recent (Dissulfiram) obtain MRI  implant for treatment of alcohol abuse removed 1 wk prior;  presents w confusion waxing & waning for last few days  Wife reports 2 week worsening orientation; 3 days prior patient went missing & they could not find him until next day  Phone turned off for 26 HR  Stressed about new job  Wife reports patient complaining of hearing voices last week but does not tell her what the voices instruct him  EXAM  Patient denies SI,  HI  Admits to hearing voices w wife in room  Denies they tel him to harm himself or anyones else  Labs elevated LA  Elevated BP  PLAN  Consult NeurologyMEENU  Date: 5/24   Day 2:   Neurology:  Unclear etiology of intermittent AMS contributing factors inl anxiety/ stress & lack of sleep  Poss factor incl relapse of alcoholism & seizure that may/may not be related to ETOH  Obtain MRI brain, routine EEG, labs pending  Consider Psyche consult   Avoid CNS altering meds; monitor neuro status & notify w changes   Tele Psyche Consult  Alcohol use disorder, unspecified mood disorder, rule out major depressive disorder/ gen anxiety disorder & alcohol withdrawal  No indication for IP Psyche; denying SI/HI & liseth for safety; stable for DC pending medical clearance; start Naltrexone 25mg  for alcohol cravings w OP provider/ PCP to incr to 50mg if mando, start Remeron QHS for sleep /mood ; ambulatory referral to OP Psyche for ongoing management     ED Triage Vitals   Temperature Pulse Respirations Blood Pressure SpO2   05/23/22 0713 05/23/22 0713 05/23/22 0713 05/23/22 0713 05/23/22 0713   98 3 °F (36 8 °C) 99 18 (!) 174/94 99 %      Temp Source Heart Rate Source Patient Position - Orthostatic VS BP Location FiO2 (%)   05/23/22 1505 -- 05/24/22 0023 05/23/22 1505 --   Oral  Lying Right arm       Pain Score       05/23/22 1505       3          Wt Readings from Last 1 Encounters:   05/23/22 93 kg (205 lb)     Additional Vital Signs:   Date/Time Temp Pulse Resp BP MAP (mmHg) SpO2 O2 Device Patient Position - Orthostatic VS   05/24/22 14:52:33 98 2 °F (36 8 °C) 93 19 144/84 104 94 % -- --   05/24/22 12:02:51 98 8 °F (37 1 °C) 95 17 140/83 102 97 % --        Date/Time Temp Pulse Resp BP MAP (mmHg) SpO2 O2 Device Patient Position - Orthostatic VS   05/24/22 08:38:31 -- 104 -- 129/91 104 95 % None (Room air) --   05/24/22 07:03:34 97 9 °F (36 6 °C) 107 Abnormal  17 127/91 103 94 % -- --   05/24/22 02:27:25 97 9 °F (36 6 °C) 93 18 130/97 108 92 % -- Sitting   05/24/22 00:23:25 98 1 °F (36 7 °C) 71 16 115/60 78 95 % -- Lying   05/23/22 20:48:54 -- 105 -- 140/94 109 95 % None (Room air) --   05/23/22 19:20:37 98 6 °F (37 °C) 98 -- 110/71 84 95 % -- --   05/23/22 1920 -- 97 -- 110/71 -- -- -- --   05/23/22 16:06:09 -- 100 -- 132/96 108 94 % -- --   05/23/22 16:05:55 -- -- -- 132/96 108 -- -- --   05/23/22 15:05:03 97 2 °F (36 2 °C) Abnormal  76 17 152/91 111 96 % None (Room air) --   05/23/22 1501 -- 74 -- 152/91 -- -- -- --   05/23/22 1030 -- 89 19 142/97 115 95 % -- --   05/23/22 1000 -- 88 18 139/89 108 96 % -- --   05/23/22 0900 -- 87 18 138/85 107 96 % -- --   05/23/22 0830 -- 85 18 138/85 106 95 % -- --   05/23/22 0713 98 3 °F (36 8 °C) 99 18 174/94 Abnormal  -- 99 % -- --       Weights (last 14 days)    Date/Time Weight Height   05/23/22 0713 93 kg (205 lb) 5' 7" (1 702 m)       Pertinent Labs/Diagnostic Test Results:   MRI brain w wo contrast   Final Result by Librado Mann MD (05/23 0305)      1  No acute intracranial pathology  Unremarkable MRI of the brain  2   Extensive right sinus disease  Workstation performed: [id number]         XR chest 2 views   ED Interpretation by Emiliano Gonzalez DO (05/23 7915)   NAD      Final Result by Babar Yang MD (05/23 9350)      Minimal scar or atelectasis at the left lung base  CT head without contrast   Final Result by Agustin Moctezuma MD (05/23 3147)      No acute intracranial abnormality  Stable appearing extensive sinus disease           Results from last 7 days   Lab Units 05/24/22 0453 05/23/22  0737 05/17/22  2210   WBC Thousand/uL 9 07 8 13 10 65*   HEMOGLOBIN g/dL 15 3 15 8 15 1   HEMATOCRIT % 45 9 46 6 44 5   PLATELETS Thousands/uL 227 238 224   NEUTROS ABS Thousands/µL 6 05 5 21 6 20         Results from last 7 days   Lab Units 05/24/22 0453 05/23/22  0737 05/17/22  2210   SODIUM mmol/L 140 139 139   POTASSIUM mmol/L 4 2 3 9 4 3   CHLORIDE mmol/L 104 102 103   CO2 mmol/L 27 26 29   ANION GAP mmol/L 9 11 7   BUN mg/dL 17 12 21   CREATININE mg/dL 1 17 1 17 1 17   EGFR ml/min/1 73sq m 70 70 70   CALCIUM mg/dL 9 0 9 1 9 0   MAGNESIUM mg/dL  --  2 0  --      Results from last 7 days   Lab Units 05/24/22 0453 05/23/22  0737 05/17/22  2210   AST U/L 34 52* 14   ALT U/L 43 47 30   ALK PHOS U/L 96 98 108   TOTAL PROTEIN g/dL 7 1 7 6 7 1   ALBUMIN g/dL 3 5 3 8 3 9   TOTAL BILIRUBIN mg/dL 0 43 0 51 0 41   AMMONIA umol/L  --  18 13     Results from last 7 days   Lab Units 05/23/22  7822 05/23/22  0733   POC GLUCOSE mg/dl 125 118     Results from last 7 days   Lab Units 05/24/22  0453 05/23/22  0737 05/17/22  2210   GLUCOSE RANDOM mg/dL 116 140 99             No results found for: BETA-HYDROXYBUTYRATE       Results from last 7 days   Lab Units 05/17/22  2210   PH STEVE  7 384   PCO2 STEVE mm Hg 46 8   PO2 STEVE mm Hg 38 4   HCO3 STEVE mmol/L 27 3   BASE EXC STEVE mmol/L 1 6   O2 CONTENT STEVE ml/dL 16 1   O2 HGB, VENOUS % 72 8             Results from last 7 days   Lab Units 05/23/22  1819 05/23/22  0918 05/23/22  0737   HS TNI 0HR ng/L  --   --  5   HS TNI 2HR ng/L  --  6  --    HSTNI D2 ng/L  --  1  --    HS TNI 4HR ng/L 5  --   --    HSTNI D4 ng/L 0  --   --          Results from last 7 days   Lab Units 05/17/22  2210   PROTIME seconds 12 2   INR  0 93   PTT seconds 27     Results from last 7 days   Lab Units 05/23/22  0737   TSH 3RD GENERATON uIU/mL 2 067         Results from last 7 days   Lab Units 05/23/22  0918 05/23/22  0737   LACTIC ACID mmol/L 1 3 2 8*                                         Results from last 7 days   Lab Units 05/23/22  0750   CLARITY UA  Clear   COLOR UA  Yellow   SPEC GRAV UA  1 020   PH UA  6 0   GLUCOSE UA mg/dl Negative   KETONES UA mg/dl Negative   BLOOD UA  Negative   PROTEIN UA mg/dl Negative   NITRITE UA  Negative   BILIRUBIN UA  Negative   UROBILINOGEN UA E U /dl 0 2   LEUKOCYTES UA  Negative             Results from last 7 days   Lab Units 05/23/22  0749   AMPH/METH  Negative   BARBITURATE UR  Negative   BENZODIAZEPINE UR  Negative   COCAINE UR  Negative   METHADONE URINE  Negative   OPIATE UR  Negative   PCP UR  Negative   THC UR  Negative     Results from last 7 days   Lab Units 05/23/22  0737 05/17/22  2210   ETHANOL LVL mg/dL <3 <3   ACETAMINOPHEN LVL ug/mL  --  <2*   SALICYLATE LVL mg/dL  --  <3*     5/23 ecg NSR   ED Treatment:   Medication Administration from 05/23/2022 0706 to 05/23/2022 1500       Date/Time Order Dose Route Action     05/23/2022 1007 sodium chloride 0 9 % bolus 1,000 mL 0 mL Intravenous Stopped     05/23/2022 0750 sodium chloride 0 9 % bolus 1,000 mL 1,000 mL Intravenous New Bag     05/23/2022 0842 LORazepam (ATIVAN) tablet 0 5 mg 0 5 mg Oral Given     05/23/2022 1024 famotidine (PEPCID) tablet 20 mg 20 mg Oral Given        Past Medical History:   Diagnosis Date    ETOH abuse      Present on Admission:   Altered mental status      Admitting Diagnosis: Lactic acidosis [E87 2]  Altered mental status [R41 82]  AMS (altered mental status) [R41 82]  Age/Sex: 47 y o  male  Admission Orders:  Cont pulse ox  ciwa  Cont observation  Routine EEG  Scheduled Medications:  [START ON 5/25/2022] cyanocobalamin, 1,000 mcg, Oral, Daily  famotidine, 20 mg, Oral, BID  mirtazapine, 7 5 mg, Oral, HS  naltrexone, 25 mg, Oral, Daily  thiamine, 100 mg, Oral, Daily      Continuous IV Infusions:     PRN Meds:  ondansetron, 4 mg, Intravenous, Q6H PRN        IP CONSULT TO NEUROLOGY  IP CONSULT TO PSYCHIATRY    Network Utilization Review Department  ATTENTION: Please call with any questions or concerns to [phone] and carefully listen to the prompts so that you are directed to the right person  All voicemails are confidential   Dewayne Tillman all requests for admission clinical reviews, approved or denied determinations and any other requests to dedicated fax number below belonging to the campus where the patient is receiving treatment   List of dedicated fax numbers for the Facilities:  [address] [place] [address] DENIALS (Administrative/Medical Necessity) [phone]   [address] (Maternity/NICU/Pediatrics) 261 [facility] [address] [facility] [place] [address] [facility] [place] [address] Daina Crenshaw 1481 [address] [phone]

## 2022-05-24 NOTE — PROGRESS NOTES
Progress Note - Neurology   Connor Azevedo 47 y o  male [id number]  Unit/Bed#: /-01    Assessment/Plan:  * Altered mental status  Assessment & Plan  47 y o  male former smoker with HTN, HLD, CAD, and EtOH abuse (reportedly quit in 12/2022 with aid of disulfiram implant, drank a couple beers about 1 5 wks ago, and implant was subsequently removed), presented to ED 5/23/2022 with intermittent confusion for the last 2 weeks  He recently went missing for 26 hours and reportedly seemed to be normal when he returned but had no recollection of the previous 26 hours    Unclear etiology of his intermittent AMS  Contributing factors include anxiety/stress and lack of sleep  Other considerations include relapse of his alcoholism, and seizures that may or may not be related to EtOH  Plan   - CTH and MRI brain with and without contrast revealed no acute findings  - routine EEG pending   - Pertinenet labs: I15 496, Folic acid 63 7, RPR neg, Ammonia 18, UA negative, ethanol less than 3, rapid urine drug screen negative  - B1 and methylmalonic acid pending  - supplement vitamin B12 with 1000mcg im x1 now followed by 1000mcg po daily starting 5/25  - can continue outpatient B12 1000mcg injections q30 days for 6 months then re-check level  - Psychiatry consult pending  - Avoid CNS altering medications if possible  - Continue to monitor neurologic status  Notify neurology with any changes in exam    - Medical management and correction of metabolic and infectious disturbances per primary team           Recommendations for outpatient neurological follow up have yet to be determined  Subjective:   "I feel ok"  Reports intermittent lightheadedness but thinks it's because he's here and not outside moving around    Past Medical History:   Diagnosis Date    ETOH abuse      History reviewed  No pertinent surgical history  History reviewed  No pertinent family history    Social History     Socioeconomic History    Marital status: /Civil Union     Spouse name: None    Number of children: None    Years of education: None    Highest education level: None   Occupational History    None   Tobacco Use    Smoking status: Never Smoker    Smokeless tobacco: Never Used   Vaping Use    Vaping Use: Never used   Substance and Sexual Activity    Alcohol use: Yes    Drug use: Never    Sexual activity: None   Other Topics Concern    None   Social History Narrative    None     Social Determinants of Health     Financial Resource Strain: Not on file   Food Insecurity: No Food Insecurity    Worried About Running Out of Food in the Last Year: Never true    Prachi of Food in the Last Year: Never true   Transportation Needs: No Transportation Needs    Lack of Transportation (Medical): No    Lack of Transportation (Non-Medical): No   Physical Activity: Not on file   Stress: Not on file   Social Connections: Not on file   Intimate Partner Violence: Not on file   Housing Stability: Low Risk     Unable to Pay for Housing in the Last Year: No    Number of Places Lived in the Last Year: 1    Unstable Housing in the Last Year: No     E-Cigarette/Vaping    E-Cigarette Use Never User      E-Cigarette/Vaping Substances     Medications: All current active meds have been reviewed and current meds:  Scheduled Meds:  Current Facility-Administered Medications   Medication Dose Route Frequency Provider Last Rate    [START ON 5/25/2022] cyanocobalamin  1,000 mcg Oral Daily CHELITA Barnett      famotidine  20 mg Oral BID Letty Grissom MD      mirtazapine  7 5 mg Oral HS Precious Tellez DO      naltrexone  25 mg Oral Daily Precious Tellez DO      ondansetron  4 mg Intravenous Q6H PRN Letty Grissom MD      thiamine  100 mg Oral Daily Yessica Hernandez PA-C       Continuous Infusions:   PRN Meds:   ondansetron     ROS:   Review of Systems   Neurological: Positive for light-headedness  All other systems reviewed and are negative       Vitals: /84   Pulse 93   Temp 98 2 °F (36 8 °C)   Resp 19   Ht 5' 7" (1 702 m)   Wt 93 kg (205 lb)   SpO2 94%   BMI 32 11 kg/m²     Physical Exam:   Physical Exam  Vitals reviewed  Constitutional:       General: He is not in acute distress  HENT:      Head: Normocephalic and atraumatic  Eyes:      Extraocular Movements: Extraocular movements intact and EOM normal       Pupils: Pupils are equal, round, and reactive to light  Pulmonary:      Effort: Pulmonary effort is normal    Musculoskeletal:      Cervical back: Neck supple  Skin:     General: Skin is warm and dry  Neurological:      General: No focal deficit present  Mental Status: He is alert and oriented to person, place, and time  Coordination: Finger-Nose-Finger Test normal       Deep Tendon Reflexes: Strength normal    Psychiatric:         Speech: Speech normal        Neurologic Exam     Mental Status   Oriented to person, place, and time  Follows 2 step commands  Speech: speech is normal   Level of consciousness: alert  Initially stated bid laden as president but quickly corrected himself to biden         Cranial Nerves     CN II   Visual fields full to confrontation  CN III, IV, VI   Pupils are equal, round, and reactive to light  Extraocular motions are normal    CN III: no CN III palsy  CN VI: no CN VI palsy  Nystagmus: none   Conjugate gaze: present    CN V   Facial sensation intact  CN VII   Facial expression full, symmetric  CN VIII   Hearing: intact    CN IX, X   Palate: symmetric    CN XI   Right sternocleidomastoid strength: normal  Right trapezius strength: normal    CN XII   Tongue deviation: none    Motor Exam     Strength   Strength 5/5 throughout  Sensory Exam   Light touch normal      Gait, Coordination, and Reflexes     Coordination   Finger to nose coordination: normal    Tremor   Resting tremor: absent      Labs: I have personally reviewed pertinent reports     Recent Results (from the past 24 hour(s))   Fingerstick Glucose (POCT)    Collection Time: 05/23/22  5:56 PM   Result Value Ref Range    POC Glucose 125 65 - 140 mg/dl   HS Troponin I 4hr    Collection Time: 05/23/22  6:19 PM   Result Value Ref Range    hs TnI 4hr 5 "Refer to ACS Flowchart"- see link ng/L    Delta 4hr hsTnI 0 <20 ng/L   RPR    Collection Time: 05/23/22  6:19 PM   Result Value Ref Range    RPR Non-Reactive Non-Reactive   Comprehensive metabolic panel    Collection Time: 05/24/22  4:53 AM   Result Value Ref Range    Sodium 140 136 - 145 mmol/L    Potassium 4 2 3 5 - 5 3 mmol/L    Chloride 104 100 - 108 mmol/L    CO2 27 21 - 32 mmol/L    ANION GAP 9 4 - 13 mmol/L    BUN 17 5 - 25 mg/dL    Creatinine 1 17 0 60 - 1 30 mg/dL    Glucose 116 65 - 140 mg/dL    Calcium 9 0 8 3 - 10 1 mg/dL    AST 34 5 - 45 U/L    ALT 43 12 - 78 U/L    Alkaline Phosphatase 96 46 - 116 U/L    Total Protein 7 1 6 4 - 8 2 g/dL    Albumin 3 5 3 5 - 5 0 g/dL    Total Bilirubin 0 43 0 20 - 1 00 mg/dL    eGFR 70 ml/min/1 73sq m   CBC and differential    Collection Time: 05/24/22  4:53 AM   Result Value Ref Range    WBC 9 07 4 31 - 10 16 Thousand/uL    RBC 5 19 3 88 - 5 62 Million/uL    Hemoglobin 15 3 12 0 - 17 0 g/dL    Hematocrit 45 9 36 5 - 49 3 %    MCV 88 82 - 98 fL    MCH 29 5 26 8 - 34 3 pg    MCHC 33 3 31 4 - 37 4 g/dL    RDW 13 3 11 6 - 15 1 %    MPV 10 6 8 9 - 12 7 fL    Platelets 956 933 - 592 Thousands/uL    nRBC 0 /100 WBCs    Neutrophils Relative 67 43 - 75 %    Immat GRANS % 0 0 - 2 %    Lymphocytes Relative 25 14 - 44 %    Monocytes Relative 7 4 - 12 %    Eosinophils Relative 1 0 - 6 %    Basophils Relative 0 0 - 1 %    Neutrophils Absolute 6 05 1 85 - 7 62 Thousands/µL    Immature Grans Absolute 0 03 0 00 - 0 20 Thousand/uL    Lymphocytes Absolute 2 22 0 60 - 4 47 Thousands/µL    Monocytes Absolute 0 61 0 17 - 1 22 Thousand/µL    Eosinophils Absolute 0 12 0 00 - 0 61 Thousand/µL    Basophils Absolute 0 04 0 00 - 0 10 Thousands/µL     Imaging: I have personally reviewed pertinent imaging in PACS  and I have personally reviewed PACS reports  EKG, Pathology, and Other Studies: I have personally reviewed pertinent reports  Counseling / Coordination of Care  Total time spent today 25 minutes  Greater than 50% of total time was spent with the patient and/or family counseling and/or coordination of care  Chart reviewed thoroughly including laboratory and imaging studies  Patient was seen and evaluated  Discussed with attending, Dr Fior Chandler   Plan of care discussed with patient, his wife and primary team

## 2022-05-25 VITALS
TEMPERATURE: 98.1 F | HEART RATE: 104 BPM | SYSTOLIC BLOOD PRESSURE: 153 MMHG | RESPIRATION RATE: 16 BRPM | BODY MASS INDEX: 32.18 KG/M2 | DIASTOLIC BLOOD PRESSURE: 99 MMHG | HEIGHT: 67 IN | OXYGEN SATURATION: 93 % | WEIGHT: 205 LBS

## 2022-05-25 LAB
ANION GAP SERPL CALCULATED.3IONS-SCNC: 11 MMOL/L (ref 4–13)
BUN SERPL-MCNC: 17 MG/DL (ref 5–25)
CALCIUM SERPL-MCNC: 9 MG/DL (ref 8.3–10.1)
CHLORIDE SERPL-SCNC: 106 MMOL/L (ref 100–108)
CO2 SERPL-SCNC: 25 MMOL/L (ref 21–32)
CREAT SERPL-MCNC: 1.05 MG/DL (ref 0.6–1.3)
ERYTHROCYTE [DISTWIDTH] IN BLOOD BY AUTOMATED COUNT: 13.2 % (ref 11.6–15.1)
GFR SERPL CREATININE-BSD FRML MDRD: 80 ML/MIN/1.73SQ M
GLUCOSE SERPL-MCNC: 120 MG/DL (ref 65–140)
HCT VFR BLD AUTO: 45.3 % (ref 36.5–49.3)
HGB BLD-MCNC: 15.2 G/DL (ref 12–17)
MCH RBC QN AUTO: 29.7 PG (ref 26.8–34.3)
MCHC RBC AUTO-ENTMCNC: 33.6 G/DL (ref 31.4–37.4)
MCV RBC AUTO: 89 FL (ref 82–98)
PLATELET # BLD AUTO: 231 THOUSANDS/UL (ref 149–390)
PMV BLD AUTO: 11 FL (ref 8.9–12.7)
POTASSIUM SERPL-SCNC: 3.7 MMOL/L (ref 3.5–5.3)
RBC # BLD AUTO: 5.12 MILLION/UL (ref 3.88–5.62)
SODIUM SERPL-SCNC: 142 MMOL/L (ref 136–145)
WBC # BLD AUTO: 8.82 THOUSAND/UL (ref 4.31–10.16)

## 2022-05-25 PROCEDURE — 85027 COMPLETE CBC AUTOMATED: CPT | Performed by: NURSE PRACTITIONER

## 2022-05-25 PROCEDURE — 95816 EEG AWAKE AND DROWSY: CPT | Performed by: PSYCHIATRY & NEUROLOGY

## 2022-05-25 PROCEDURE — 80048 BASIC METABOLIC PNL TOTAL CA: CPT | Performed by: NURSE PRACTITIONER

## 2022-05-25 RX ORDER — MIRTAZAPINE 7.5 MG/1
7.5 TABLET, FILM COATED ORAL
Qty: 30 TABLET | Refills: 0 | Status: SHIPPED | OUTPATIENT
Start: 2022-05-25

## 2022-05-25 RX ORDER — THIAMINE MONONITRATE (VIT B1) 100 MG
100 TABLET ORAL DAILY
Qty: 30 TABLET | Refills: 0 | Status: SHIPPED | OUTPATIENT
Start: 2022-05-26

## 2022-05-25 RX ORDER — NALTREXONE HYDROCHLORIDE 50 MG/1
25 TABLET, FILM COATED ORAL DAILY
Qty: 30 TABLET | Refills: 0 | Status: SHIPPED | OUTPATIENT
Start: 2022-05-26

## 2022-05-25 RX ADMIN — CYANOCOBALAMIN TAB 500 MCG 1000 MCG: 500 TAB at 08:53

## 2022-05-25 RX ADMIN — NALTREXONE HYDROCHLORIDE 25 MG: 50 TABLET, FILM COATED ORAL at 08:54

## 2022-05-25 RX ADMIN — THIAMINE HCL TAB 100 MG 100 MG: 100 TAB at 08:53

## 2022-05-25 RX ADMIN — FAMOTIDINE 20 MG: 20 TABLET ORAL at 08:53

## 2022-05-25 NOTE — PLAN OF CARE
Problem: Potential for Falls  Goal: Patient will remain free of falls  Description: INTERVENTIONS:  - Educate patient/family on patient safety including physical limitations  - Instruct patient to call for assistance with activity   - Consult OT/PT to assist with strengthening/mobility   - Keep Call bell within reach  - Keep bed low and locked with side rails adjusted as appropriate  - Keep care items and personal belongings within reach  - Initiate and maintain comfort rounds  - Apply yellow socks and bracelet for high fall risk patients  - Consider moving patient to room near nurses station  Outcome: Progressing     Problem: PAIN - ADULT  Goal: Verbalizes/displays adequate comfort level or baseline comfort level  Description: Interventions:  - Encourage patient to monitor pain and request assistance  - Assess pain using appropriate pain scale  - Administer analgesics based on type and severity of pain and evaluate response  - Implement non-pharmacological measures as appropriate and evaluate response  - Consider cultural and social influences on pain and pain management  - Notify physician/advanced practitioner if interventions unsuccessful or patient reports new pain  Outcome: Progressing     Problem: DISCHARGE PLANNING  Goal: Discharge to home or other facility with appropriate resources  Description: INTERVENTIONS:  - Identify barriers to discharge w/patient and caregiver  - Arrange for needed discharge resources and transportation as appropriate  - Identify discharge learning needs (meds, wound care, etc )  - Arrange for interpretive services to assist at discharge as needed  - Refer to Case Management Department for coordinating discharge planning if the patient needs post-hospital services based on physician/advanced practitioner order or complex needs related to functional status, cognitive ability, or social support system  Outcome: Progressing     Problem: Knowledge Deficit  Goal: Patient/family/caregiver demonstrates understanding of disease process, treatment plan, medications, and discharge instructions  Description: Complete learning assessment and assess knowledge base  Interventions:  - Provide teaching at level of understanding  - Provide teaching via preferred learning methods  Outcome: Progressing     Problem: NEUROSENSORY - ADULT  Goal: Achieves stable or improved neurological status  Description: INTERVENTIONS  - Monitor and report changes in neurological status  - Monitor vital signs such as temperature, blood pressure, glucose, and any other labs ordered   - Initiate measures to prevent increased intracranial pressure  - Monitor for seizure activity and implement precautions if appropriate      Outcome: Progressing  Goal: Achieves maximal functionality and self care  Description: INTERVENTIONS  - Monitor swallowing and airway patency with patient fatigue and changes in neurological status  - Encourage and assist patient to increase activity and self care     - Encourage visually impaired, hearing impaired and aphasic patients to use assistive/communication devices  Outcome: Progressing     Problem: METABOLIC, FLUID AND ELECTROLYTES - ADULT  Goal: Electrolytes maintained within normal limits  Description: INTERVENTIONS:  - Monitor labs and assess patient for signs and symptoms of electrolyte imbalances  - Administer electrolyte replacement as ordered  - Monitor response to electrolyte replacements, including repeat lab results as appropriate  - Instruct patient on fluid and nutrition as appropriate  Outcome: Progressing  Goal: Fluid balance maintained  Description: INTERVENTIONS:  - Monitor labs   - Monitor I/O and WT  - Instruct patient on fluid and nutrition as appropriate  - Assess for signs & symptoms of volume excess or deficit  Outcome: Progressing

## 2022-05-25 NOTE — PLAN OF CARE
Problem: Potential for Falls  Goal: Patient will remain free of falls  Description: INTERVENTIONS:  - Educate patient/family on patient safety including physical limitations  - Instruct patient to call for assistance with activity   - Consult OT/PT to assist with strengthening/mobility   - Keep Call bell within reach  - Keep bed low and locked with side rails adjusted as appropriate  - Keep care items and personal belongings within reach  - Initiate and maintain comfort rounds  - Make Fall Risk Sign visible to staff  - Apply yellow socks and bracelet for high fall risk patients  - Consider moving patient to room near nurses station  Outcome: Progressing     Problem: PAIN - ADULT  Goal: Verbalizes/displays adequate comfort level or baseline comfort level  Description: Interventions:  - Encourage patient to monitor pain and request assistance  - Assess pain using appropriate pain scale  - Administer analgesics based on type and severity of pain and evaluate response  - Implement non-pharmacological measures as appropriate and evaluate response  - Consider cultural and social influences on pain and pain management  - Notify physician/advanced practitioner if interventions unsuccessful or patient reports new pain  Outcome: Progressing     Problem: INFECTION - ADULT  Goal: Absence or prevention of progression during hospitalization  Description: INTERVENTIONS:  - Assess and monitor for signs and symptoms of infection  - Monitor lab/diagnostic results  - Monitor all insertion sites, i e  indwelling lines, tubes, and drains  - Monitor endotracheal if appropriate and nasal secretions for changes in amount and color  - Parker appropriate cooling/warming therapies per order  - Administer medications as ordered  - Instruct and encourage patient and family to use good hand hygiene technique  - Identify and instruct in appropriate isolation precautions for identified infection/condition  Outcome: Progressing  Goal: Absence of fever/infection during neutropenic period  Description: INTERVENTIONS:  - Monitor WBC  Outcome: Progressing     Problem: SAFETY ADULT  Goal: Patient will remain free of falls  Description: INTERVENTIONS:  - Educate patient/family on patient safety including physical limitations  - Instruct patient to call for assistance with activity   - Consult OT/PT to assist with strengthening/mobility   - Keep Call bell within reach  - Keep bed low and locked with side rails adjusted as appropriate  - Keep care items and personal belongings within reach  - Initiate and maintain comfort rounds  - Make Fall Risk Sign visible to staff  - Apply yellow socks and bracelet for high fall risk patients  - Consider moving patient to room near nurses station  Outcome: Progressing  Goal: Maintain or return to baseline ADL function  Description: INTERVENTIONS:  -  Assess patient's ability to carry out ADLs; assess patient's baseline for ADL function and identify physical deficits which impact ability to perform ADLs (bathing, care of mouth/teeth, toileting, grooming, dressing, etc )  - Assess/evaluate cause of self-care deficits   - Assess range of motion  - Assess patient's mobility; develop plan if impaired  - Assess patient's need for assistive devices and provide as appropriate  - Encourage maximum independence but intervene and supervise when necessary  - Involve family in performance of ADLs  - Assess for home care needs following discharge   - Consider OT consult to assist with ADL evaluation and planning for discharge  - Provide patient education as appropriate  Outcome: Progressing  Goal: Maintains/Returns to pre admission functional level  Description: INTERVENTIONS:  - Perform BMAT or MOVE assessment daily    - Set and communicate daily mobility goal to care team and patient/family/caregiver     - Collaborate with rehabilitation services on mobility goals if consulted  - Record patient progress and toleration of activity level Outcome: Progressing     Problem: DISCHARGE PLANNING  Goal: Discharge to home or other facility with appropriate resources  Description: INTERVENTIONS:  - Identify barriers to discharge w/patient and caregiver  - Arrange for needed discharge resources and transportation as appropriate  - Identify discharge learning needs (meds, wound care, etc )  - Arrange for interpretive services to assist at discharge as needed  - Refer to Case Management Department for coordinating discharge planning if the patient needs post-hospital services based on physician/advanced practitioner order or complex needs related to functional status, cognitive ability, or social support system  Outcome: Progressing     Problem: Knowledge Deficit  Goal: Patient/family/caregiver demonstrates understanding of disease process, treatment plan, medications, and discharge instructions  Description: Complete learning assessment and assess knowledge base  Interventions:  - Provide teaching at level of understanding  - Provide teaching via preferred learning methods  Outcome: Progressing     Problem: NEUROSENSORY - ADULT  Goal: Achieves stable or improved neurological status  Description: INTERVENTIONS  - Monitor and report changes in neurological status  - Monitor vital signs such as temperature, blood pressure, glucose, and any other labs ordered   - Initiate measures to prevent increased intracranial pressure  - Monitor for seizure activity and implement precautions if appropriate      Outcome: Progressing  Goal: Achieves maximal functionality and self care  Description: INTERVENTIONS  - Monitor swallowing and airway patency with patient fatigue and changes in neurological status  - Encourage and assist patient to increase activity and self care     - Encourage visually impaired, hearing impaired and aphasic patients to use assistive/communication devices  Outcome: Progressing     Problem: METABOLIC, FLUID AND ELECTROLYTES - ADULT  Goal: Electrolytes maintained within normal limits  Description: INTERVENTIONS:  - Monitor labs and assess patient for signs and symptoms of electrolyte imbalances  - Administer electrolyte replacement as ordered  - Monitor response to electrolyte replacements, including repeat lab results as appropriate  - Instruct patient on fluid and nutrition as appropriate  Outcome: Progressing  Goal: Fluid balance maintained  Description: INTERVENTIONS:  - Monitor labs   - Monitor I/O and WT  - Instruct patient on fluid and nutrition as appropriate  - Assess for signs & symptoms of volume excess or deficit  Outcome: Progressing

## 2022-05-25 NOTE — CONSULTS
Consultation - Neuropsychology/Psychology Department  Christopher Palumbo 47 y o  male MRN: [id number]  Unit/Bed#: -01 Encounter: [id number]        Reason for Consultation:  Christopher Palumbo is a 47y o  year old male who was referred for a Neuropsychological Exam to assess cognitive functioning and comment on capacity to make informed medical decisions  History of Present Illness    Altered mental status    Physician Requesting Consult: Rachele Cavazos MD    PROBLEM LIST:  Patient Active Problem List   Diagnosis    Altered mental status         Historical Information   Past Medical History:   Diagnosis Date    ETOH abuse      History reviewed  No pertinent surgical history  Social History   Social History     Substance and Sexual Activity   Alcohol Use Yes     Social History     Substance and Sexual Activity   Drug Use Never     Social History     Tobacco Use   Smoking Status Never Smoker   Smokeless Tobacco Never Used     Family History: History reviewed  No pertinent family history  Meds/Allergies   current meds:   Current Facility-Administered Medications   Medication Dose Route Frequency    cyanocobalamin (VITAMIN B-12) tablet 1,000 mcg  1,000 mcg Oral Daily    famotidine (PEPCID) tablet 20 mg  20 mg Oral BID    mirtazapine (REMERON) tablet 7 5 mg  7 5 mg Oral HS    naltrexone (REVIA) tablet 25 mg  25 mg Oral Daily    ondansetron (ZOFRAN) injection 4 mg  4 mg Intravenous Q6H PRN    thiamine tablet 100 mg  100 mg Oral Daily       No Known Allergies      Family and Social Support:   Discharge planning discussed with[de-identified] patient and spouse at bedside  Freedom of Choice: Yes      Behavioral Observations: Alert, oriented except for season; denied depressed mood and anxiety; responses at times were delayed; patient reported he wanted to leave hospital "because I wanted to rest a little"   Patient reports there eis nothing wrong with him medically and was unable to accurately state reason for hospitalization  Cognitive Examination    General Cognitive Functioning MMSE = Deficits in recall; Attention/Concentration Auditory Selective Attention = Impaired; Auditory Vigilance = Within Normal Limits; Information Processing Speed = Borderline    Frontal Systems/Executive Functioning Mental Flexibility/Cognitive Control = Impaired; Working Memory = Impaired Abstract Reasoning = Impaired; Generative Ability = Low Average, Commonsense Reasoning and Judgement = Impaired    Language Functioning Phonemic Fluency = Low Average; Semantic Retrieval = Impaired; Comprehension of Complex Ideational Material = Impaired;     Memory Functioning Narrative Recall - Short Delay = Severely Impaired; Long Delay Narrative Recall = Severely Impaired;     Visuo-Spatial Abilities Not Assessed    Functional Knowledge  Health & Safety Knowledge = Impaired;     Summary/Impression:  Results of Neuropsychological Exam revealed diffuse cognitive dysfunction and on a measure assessing awareness of personal health status and ability to evaluate health problems, handle medical emergencies and take safety precautions, patient performed in the IMPAIRED range of functioning  At this time, patient does not appear to have capacity to make fully informed medical decisions   Unspecified Neurocognitive Disorder

## 2022-05-25 NOTE — UTILIZATION REVIEW
Continued Stay Review    Date: 5/25                         Current Patient Class: OBS  Current Level of Care: MS     HPI:52 y o  male initially admitted on  5/23    Assessment/Plan:     5/25 Neuro Note   Altered mental status unclear etiology  Other considerations include relapse of his alcoholism, and seizures that may or may not be related to EtOH  Plan for routine EEG pending   5/25 Neuropsychology /psych consult   Neuropsychological Exam to assess cognitive functioning and comment on capacity to make informed medical decisions      Vital Signs:   05/25/22 07:48:09 98 1 °F (36 7 °C) 91 -- 159/94 116 91 % -- --   05/25/22 03:46:39 98 8 °F (37 1 °C) 109 Abnormal  -- 150/91 111 98 % --          Pertinent Labs/Diagnostic Results:     Results from last 7 days   Lab Units 05/25/22  0542 05/24/22  0453 05/23/22  0737   WBC Thousand/uL 8 82 9 07 8 13   HEMOGLOBIN g/dL 15 2 15 3 15 8   HEMATOCRIT % 45 3 45 9 46 6   PLATELETS Thousands/uL 231 227 238   NEUTROS ABS Thousands/µL  --  6 05 5 21     Results from last 7 days   Lab Units 05/25/22  0542 05/24/22  0453 05/23/22  0737   SODIUM mmol/L 142 140 139   POTASSIUM mmol/L 3 7 4 2 3 9   CHLORIDE mmol/L 106 104 102   CO2 mmol/L 25 27 26   ANION GAP mmol/L 11 9 11   BUN mg/dL 17 17 12   CREATININE mg/dL 1 05 1 17 1 17   EGFR ml/min/1 73sq m 80 70 70   CALCIUM mg/dL 9 0 9 0 9 1   MAGNESIUM mg/dL  --   --  2 0     Results from last 7 days   Lab Units 05/24/22  0453 05/23/22  0737   AST U/L 34 52*   ALT U/L 43 47   ALK PHOS U/L 96 98   TOTAL PROTEIN g/dL 7 1 7 6   ALBUMIN g/dL 3 5 3 8   TOTAL BILIRUBIN mg/dL 0 43 0 51   AMMONIA umol/L  --  18     Results from last 7 days   Lab Units 05/23/22  1756 05/23/22  0733   POC GLUCOSE mg/dl 125 118     Results from last 7 days   Lab Units 05/25/22  0542 05/24/22  0453 05/23/22  0737   GLUCOSE RANDOM mg/dL 120 116 140     Results from last 7 days   Lab Units 05/23/22  1819 05/23/22  0918 05/23/22  0737   HS TNI 0HR ng/L  --   --  5 HS TNI 2HR ng/L  --  6  --    HSTNI D2 ng/L  --  1  --    HS TNI 4HR ng/L 5  --   --    HSTNI D4 ng/L 0  --   --      Results from last 7 days   Lab Units 05/23/22  0737   TSH 3RD GENERATON uIU/mL 2 067     Results from last 7 days   Lab Units 05/23/22  0918 05/23/22  0737   LACTIC ACID mmol/L 1 3 2 8*       Results from last 7 days   Lab Units 05/23/22  0750   CLARITY UA  Clear   COLOR UA  Yellow   SPEC GRAV UA  1 020   PH UA  6 0   GLUCOSE UA mg/dl Negative   KETONES UA mg/dl Negative   BLOOD UA  Negative   PROTEIN UA mg/dl Negative   NITRITE UA  Negative   BILIRUBIN UA  Negative   UROBILINOGEN UA E U /dl 0 2   LEUKOCYTES UA  Negative     Results from last 7 days   Lab Units 05/23/22  0749   AMPH/METH  Negative   BARBITURATE UR  Negative   BENZODIAZEPINE UR  Negative   COCAINE UR  Negative   METHADONE URINE  Negative   OPIATE UR  Negative   PCP UR  Negative   THC UR  Negative     Results from last 7 days   Lab Units 05/23/22  0737   ETHANOL LVL mg/dL <3       Medications:   Scheduled Medications:  cyanocobalamin, 1,000 mcg, Oral, Daily  famotidine, 20 mg, Oral, BID  mirtazapine, 7 5 mg, Oral, HS  naltrexone, 25 mg, Oral, Daily  thiamine, 100 mg, Oral, Daily      Continuous IV Infusions:     PRN Meds:  ondansetron, 4 mg, Intravenous, Q6H PRN        Discharge Plan: D     Network Utilization Review Department  ATTENTION: Please call with any questions or concerns to [phone] and carefully listen to the prompts so that you are directed to the right person  All voicemails are confidential   Dewayne Tillman all requests for admission clinical reviews, approved or denied determinations and any other requests to dedicated fax number below belonging to the campus where the patient is receiving treatment   List of dedicated fax numbers for the Facilities:  [address] DENIALS (Administrative/Medical Necessity) [phone]   [address] (Maternity/NICU/Pediatrics) [phone]   Trenton Galan 903 [facility] 40 125 [facility]  [phone]   [address] [facility] [place] [address] [address] [place] [address] Daina Crenshaw 1481 [address] [facility] [address] [employer] [phone]

## 2022-05-25 NOTE — UTILIZATION REVIEW
Observation Admission Authorization Request   NOTIFICATION OF OBSERVATION ADMISSION/OBSERVATION AUTHORIZATION REQUEST   SERVICING FACILITY:   [address] [place] [address]  Tax ID: [id number]  NPI: [id number]  Place of Service: On [address] Code: 22  CPT Code for Observation: CPT   CPT 28329     ATTENDING PROVIDER:  Attending Name and NPI#: Damaris Phipps, Marilia Amarjitas Gladis [[id number]]  Address: [address] [place] [address]  Phone: [phone]     UTILIZATION REVIEW CONTACT:  [facility] Utilization   Network Utilization Review Department  Phone: [phone]  Fax [phone]  Email: [email or web address]     PHYSICIAN ADVISORY SERVICES:  FOR EOSO-OE-NXQM REVIEW - MEDICAL NECESSITY DENIAL  Phone: [phone]  Fax: [phone]  Email: [email or web address]     TYPE OF REQUEST:  Observation Status     ADMISSION INFORMATION:  ADMISSION DATE/TIME: 5/23/2022 08:47 AM  PATIENT DIAGNOSIS CODE/DESCRIPTION:  Lactic acidosis [E87 2]  Altered mental status [R41 82]  AMS (altered mental status) [R41 82]  DISCHARGE DATE/TIME: No discharge date for patient encounter  IMPORTANT INFORMATION:  Please contact the [facility] directly with any questions or concerns regarding this request  Department voicemails are confidential     Send requests for admission clinical reviews, concurrent reviews, approvals, and administrative denials due to lack of clinical to fax [phone]

## 2022-05-25 NOTE — CASE MANAGEMENT
Case Management Progress Note    Patient name Michael Ill  Location [address] 333/-01 MRN [id number]  : 1970 Date 2022       LOS (days): 0  Geometric Mean LOS (GMLOS) (days):   Days to GMLOS:        OBJECTIVE:        Current admission status: Observation  Preferred Pharmacy:   [address] [place] [address] [id number] R R 1 ([phone] [address] ([address])  [address] [place] [address]  Phone: [phone] Fax: [phone]    Primary Care Provider: No primary care provider on file  Primary Insurance: BLUE CROSS  Secondary Insurance:     PROGRESS NOTE:  CM called CMP D&A at [phone] and informed Mariah Sethi of patient's discharge today

## 2022-05-25 NOTE — NURSING NOTE
Patient is increasingly agitated and anxious  Ripped out IV  Took off Masimo and ID bracelet  Placed shoes on and continued to pace in the room  Repeatedly asking PCA and RN about discharge order  Kept trying to leave room  Instructed patient to not leave room without a staff member  SLIM notified to see if continual observation could be reordered due to elopement risk  One time dose of 1 mg of Ativan IV as well as 1 mg of Haldol IM were ordered  Patient was able to be redirected and willing to stay  CIWA score of 7  Haldol given  Once IV access is obtained, Ativan will be given  Will continue to monitor

## 2022-05-31 LAB — VIT B1 BLD-SCNC: 171.2 NMOL/L (ref 66.5–200)

## 2022-06-02 NOTE — DISCHARGE SUMMARY
Discharging Physician / Practitioner: Bibi Mcelroy MD  PCP: No primary care provider on file  Admission Date:   Admission Orders (From admission, onward)     Ordered        05/23/22 0847  Place in Observation  Once                      Discharge Date: 05/25/22     Medical Problems             Resolved Problems  Date Reviewed: 5/23/2022   None                 Consultations During Hospital Stay:  · Neurology, psych    Procedures Performed:   · none    Significant Findings / Test Results:   XR chest 2 views    Result Date: 5/23/2022  Impression: Minimal scar or atelectasis at the left lung base  Workstation performed: [id number]     CT head without contrast    Result Date: 5/23/2022  Impression: No acute intracranial abnormality  Stable appearing extensive sinus disease  Workstation performed: [id number]     MRI brain w wo contrast    Result Date: 5/23/2022  · Impression: 1  No acute intracranial pathology  Unremarkable MRI of the brain  2   Extensive right sinus disease  Workstation performed: [id number]     Incidental Findings:   · none     Test Results Pending at Discharge (will require follow up):   · none     Outpatient Tests Requested:  · none    Complications:  none    Reason for Admission: Altered mental status    Hospital Course:     Dianelys Shelton is a 47 y o  male patient who originally presented to the hospital on 5/23/2022 due to altered mental status likely secondary to alcohol withdrawal, lack of sleep and anxiety  MRI brain within normal limits  EEG ruled out seizure   Back to baseline mental status  Will have outpatient follow-up with psych        Please see above list of diagnoses and related plan for additional information       Condition at Discharge: stable     Discharge Day Visit / Exam:     Subjective:  Denies chest pain, sob, cough, fevers  Vitals: Blood Pressure: 153/99 (05/25/22 1505)  Pulse: 104 (05/25/22 1143)  Temperature: 98 1 °F (36 7 °C) (05/25/22 1505)  Temp Source: Oral (05/25/22 6554)  Respirations: 16 (05/25/22 1505)  Height: 5' 7" (170 2 cm) (05/23/22 0713)  Weight - Scale: 93 kg (205 lb) (05/23/22 0713)  SpO2: 93 % (05/25/22 1143)  Exam:   Physical Exam  Constitutional:       General: He is not in acute distress  Appearance: He is well-developed  He is not diaphoretic  HENT:      Head: Normocephalic and atraumatic  Nose: Nose normal       Mouth/Throat:      Pharynx: No oropharyngeal exudate  Eyes:      General: No scleral icterus  Conjunctiva/sclera: Conjunctivae normal    Cardiovascular:      Rate and Rhythm: Normal rate and regular rhythm  Heart sounds: Normal heart sounds  No murmur heard  No friction rub  No gallop  Pulmonary:      Effort: Pulmonary effort is normal  No respiratory distress  Breath sounds: Normal breath sounds  No wheezing or rales  Chest:      Chest wall: No tenderness  Abdominal:      General: Bowel sounds are normal  There is no distension  Palpations: Abdomen is soft  Tenderness: There is no abdominal tenderness  There is no guarding  Musculoskeletal:         General: No tenderness or deformity  Normal range of motion  Cervical back: Normal range of motion and neck supple  Skin:     General: Skin is warm and dry  Findings: No erythema  Neurological:      Mental Status: He is alert  Mental status is at baseline  Discussion with Family: pt    Discharge instructions/Information to patient and family:   See after visit summary for information provided to patient and family  Provisions for Follow-Up Care:  See after visit summary for information related to follow-up care and any pertinent home health orders  Disposition:     Home    For Discharges to [facility]F:   · Not Applicable to this Patient - Not Applicable to this Patient    Planned Readmission: none     Discharge Statement:  I spent 60 minutes discharging the patient  This time was spent on the day of discharge   I had direct contact with the patient on the day of discharge  Greater than 50% of the total time was spent examining patient, answering all patient questions, arranging and discussing plan of care with patient as well as directly providing post-discharge instructions  Additional time then spent on discharge activities  Discharge Medications:  See after visit summary for reconciled discharge medications provided to patient and family        ** Please Note: This note has been constructed using a voice recognition system **

## 2022-07-11 PROCEDURE — 87070 CULTURE OTHR SPECIMN AEROBIC: CPT | Performed by: OTOLARYNGOLOGY

## 2022-07-11 PROCEDURE — 87077 CULTURE AEROBIC IDENTIFY: CPT | Performed by: OTOLARYNGOLOGY

## 2022-07-11 PROCEDURE — 87186 SC STD MICRODIL/AGAR DIL: CPT | Performed by: OTOLARYNGOLOGY

## 2022-07-11 PROCEDURE — 87205 SMEAR GRAM STAIN: CPT | Performed by: OTOLARYNGOLOGY

## 2022-08-15 ENCOUNTER — TELEPHONE (OUTPATIENT)
Dept: OTHER | Facility: OTHER | Age: 52
End: 2022-08-15

## 2023-01-12 ENCOUNTER — HOSPITAL ENCOUNTER (EMERGENCY)
Facility: HOSPITAL | Age: 53
Discharge: HOME | End: 2023-01-12
Attending: EMERGENCY MEDICINE

## 2023-01-12 VITALS
OXYGEN SATURATION: 97 % | SYSTOLIC BLOOD PRESSURE: 120 MMHG | HEART RATE: 75 BPM | RESPIRATION RATE: 18 BRPM | DIASTOLIC BLOOD PRESSURE: 81 MMHG | TEMPERATURE: 96.9 F

## 2023-01-12 DIAGNOSIS — F41.9 ANXIETY: ICD-10-CM

## 2023-01-12 DIAGNOSIS — F32.A DEPRESSION: Primary | ICD-10-CM

## 2023-01-12 RX ORDER — LORAZEPAM 1 MG/1
1 TABLET ORAL ONCE
Status: COMPLETED | OUTPATIENT
Start: 2023-01-12 | End: 2023-01-12

## 2023-01-12 RX ORDER — ESCITALOPRAM OXALATE 10 MG/1
10 TABLET ORAL DAILY
Qty: 21 TABLET | Refills: 0 | Status: SHIPPED | OUTPATIENT
Start: 2023-01-12 | End: 2023-02-02

## 2023-01-12 RX ORDER — ESCITALOPRAM OXALATE 10 MG/1
10 TABLET ORAL ONCE
Status: COMPLETED | OUTPATIENT
Start: 2023-01-12 | End: 2023-01-12

## 2023-01-12 RX ORDER — ESCITALOPRAM OXALATE 10 MG/1
10 TABLET ORAL DAILY
Status: DISCONTINUED | OUTPATIENT
Start: 2023-01-13 | End: 2023-01-12

## 2023-01-12 RX ADMIN — LORAZEPAM 1 MG: 1 TABLET ORAL at 22:50

## 2023-01-12 RX ADMIN — ESCITALOPRAM OXALATE 10 MG: 10 TABLET ORAL at 22:50

## 2023-01-13 NOTE — DISCHARGE INSTRUCTIONS
Please follow up PCP and psych  Recommend tylenol 650 mg and ibuprofen 600 mg every 6 hours as needed for pain  Please return for severe chest pain, significant shortness of breath, severely worsening symptoms, or any other concerning signs or symptoms  Please refer to the following documents for additional instructions and return precautions

## 2023-01-13 NOTE — ED PROVIDER NOTES
History  Chief Complaint   Patient presents with   • Psychiatric Evaluation     Pt's daughter reports he has been off of his Lexapro for two weeks because he ran out and his doctor is on vacation  Pt denies SI/ HI  Daughter reports high anxiety and tremors, needs refill     79-year-old male history of depression presenting with anxiety and medication refill  Patient reports running out of his Lexapro a couple weeks ago because his physician is on vacation  Patient has follow-up appointment with his psychiatrist when he returns in about a week  Currently here due to feeling anxious and having trouble sleeping the last week or so since running out of Lexapro  Denies any chest pain shortness of breath  Denies any SI, HI, hallucinations  Here with daughter  Denies any other complaints  Past Medical History:  No date: ETOH abuse  No date: Nasal congestion  Family History: non-contributory  Social History            Prior to Admission Medications   Prescriptions Last Dose Informant Patient Reported? Taking?    cyanocobalamin (VITAMIN B-12) 1000 MCG tablet   No No   Sig: Take 1 tablet (1,000 mcg total) by mouth daily   famotidine (PEPCID) 20 mg tablet   No No   Sig: Take 1 tablet (20 mg total) by mouth 2 (two) times a day   Patient not taking: No sig reported   mirtazapine (REMERON) 7 5 MG tablet   No No   Sig: Take 1 tablet (7 5 mg total) by mouth daily at bedtime   naltrexone (REVIA) 50 mg tablet   No No   Sig: Take 0 5 tablets (25 mg total) by mouth daily   omeprazole (PriLOSEC) 20 mg delayed release capsule   No No   Sig: Take 1 capsule (20 mg total) by mouth 2 (two) times a day for 14 days   predniSONE 10 mg tablet   No No   Sig: Take 1 tablet (10 mg total) by mouth daily 40 mg daily x 4 days, 30 mg daily x 4 days, 20 mg daily x 4 days, 10 mg daily x 4 days   thiamine (VITAMIN B1) 100 mg tablet   No No   Sig: Take 1 tablet (100 mg total) by mouth daily      Facility-Administered Medications: None       Past Medical History:   Diagnosis Date   • ETOH abuse    • Nasal congestion        History reviewed  No pertinent surgical history  History reviewed  No pertinent family history  I have reviewed and agree with the history as documented  E-Cigarette/Vaping   • E-Cigarette Use Never User      E-Cigarette/Vaping Substances     Social History     Tobacco Use   • Smoking status: Never   • Smokeless tobacco: Never   Vaping Use   • Vaping Use: Never used   Substance Use Topics   • Alcohol use: Yes   • Drug use: Never       Review of Systems   Constitutional: Negative for appetite change, chills, diaphoresis, fever and unexpected weight change  HENT: Negative for congestion and rhinorrhea  Eyes: Negative for photophobia and visual disturbance  Respiratory: Negative for cough, chest tightness and shortness of breath  Cardiovascular: Negative for chest pain, palpitations and leg swelling  Gastrointestinal: Negative for abdominal distention, abdominal pain, blood in stool, constipation, diarrhea, nausea and vomiting  Genitourinary: Negative for dysuria and hematuria  Musculoskeletal: Negative for back pain, joint swelling, neck pain and neck stiffness  Skin: Negative for color change, pallor, rash and wound  Neurological: Negative for dizziness, syncope, weakness, light-headedness and headaches  Psychiatric/Behavioral: Negative for agitation and suicidal ideas  The patient is nervous/anxious  All other systems reviewed and are negative  Physical Exam  Physical Exam  Vitals and nursing note reviewed  Constitutional:       General: He is not in acute distress  Appearance: Normal appearance  He is well-developed  He is not ill-appearing, toxic-appearing or diaphoretic  HENT:      Head: Normocephalic and atraumatic  Nose: Nose normal  No congestion or rhinorrhea  Mouth/Throat:      Mouth: Mucous membranes are moist       Pharynx: Oropharynx is clear   No oropharyngeal exudate or posterior oropharyngeal erythema  Eyes:      General: No scleral icterus  Right eye: No discharge  Left eye: No discharge  Extraocular Movements: Extraocular movements intact  Conjunctiva/sclera: Conjunctivae normal       Pupils: Pupils are equal, round, and reactive to light  Neck:      Vascular: No JVD  Trachea: No tracheal deviation  Comments: Supple  Normal range of motion  Cardiovascular:      Rate and Rhythm: Normal rate and regular rhythm  Heart sounds: Normal heart sounds  No murmur heard  No friction rub  No gallop  Comments: Normal rate and regular rhythm  Pulmonary:      Effort: Pulmonary effort is normal  No respiratory distress  Breath sounds: Normal breath sounds  No stridor  No wheezing or rales  Comments: Clear to auscultation bilaterally  Chest:      Chest wall: No tenderness  Abdominal:      General: Bowel sounds are normal  There is no distension  Palpations: Abdomen is soft  Tenderness: There is no abdominal tenderness  There is no right CVA tenderness, left CVA tenderness, guarding or rebound  Comments: Soft, nontender, nondistended  Normal bowel sounds throughout   Musculoskeletal:         General: No swelling, tenderness, deformity or signs of injury  Normal range of motion  Cervical back: Normal range of motion and neck supple  No rigidity or tenderness  No muscular tenderness  Right lower leg: No edema  Left lower leg: No edema  Lymphadenopathy:      Cervical: No cervical adenopathy  Skin:     General: Skin is warm and dry  Coloration: Skin is not pale  Findings: No erythema or rash  Neurological:      General: No focal deficit present  Mental Status: He is alert  Mental status is at baseline  Sensory: No sensory deficit  Motor: No weakness or abnormal muscle tone  Coordination: Coordination normal       Gait: Gait normal       Comments: Alert    Strength and sensation grossly intact  Ambulatory without difficulty at baseline  Psychiatric:         Behavior: Behavior normal          Thought Content: Thought content normal          Vital Signs  ED Triage Vitals [01/12/23 2215]   Temperature Pulse Respirations Blood Pressure SpO2   (!) 96 9 °F (36 1 °C) 75 18 120/81 97 %      Temp Source Heart Rate Source Patient Position - Orthostatic VS BP Location FiO2 (%)   Tympanic Monitor Sitting Left arm --      Pain Score       --           Vitals:    01/12/23 2215   BP: 120/81   Pulse: 75   Patient Position - Orthostatic VS: Sitting         Visual Acuity      ED Medications  Medications   LORazepam (ATIVAN) tablet 1 mg (1 mg Oral Given 1/12/23 2250)   escitalopram (LEXAPRO) tablet 10 mg (10 mg Oral Given 1/12/23 2250)       Diagnostic Studies  Results Reviewed     None                 No orders to display              Procedures  Procedures         ED Course                                             Medical Decision Making  50-year-old male history of depression presenting with anxiety and medication refill  More anxious and trouble sleeping since running out of home SSRI  Has follow-up appointment with psychiatrist in about a week  No SI, HI, hallucinations  Will give dose of medication here plus anxiety medicine  Prescription for home Lexapro sent to pharmacy  Discussed results and recommendations  Advised follow up PCP and psych  Medication recommendations  Given instructions and return precautions  Patient/family at bedside acknowledged understanding of all written and verbal instructions and return precautions  Discharged  Anxiety: complicated acute illness or injury  Depression: complicated acute illness or injury  Risk  Prescription drug management            Disposition  Final diagnoses:   Depression   Anxiety     Time reflects when diagnosis was documented in both MDM as applicable and the Disposition within this note     Time User Action Codes Description Comment    1/12/2023 10:27 PM Tessy Anders Add Ceci Peppers  A] Depression     1/12/2023 10:30 PM Tessy Anders Add [F41 9] Anxiety       ED Disposition     ED Disposition   Discharge    Condition   Stable    Date/Time   Thu Jan 12, 2023 10:30 PM    Comment   Carlos Edwards discharge to home/self care  Follow-up Information     Follow up With Specialties Details Why Contact Info    [employer]  Call  for -[phone]            Discharge Medication List as of 1/12/2023 10:35 PM      START taking these medications    Details   escitalopram (Lexapro) 10 mg tablet Take 1 tablet (10 mg total) by mouth daily for 21 days, Starting Thu 1/12/2023, Until Thu 2/2/2023, Normal         CONTINUE these medications which have NOT CHANGED    Details   cyanocobalamin (VITAMIN B-12) 1000 MCG tablet Take 1 tablet (1,000 mcg total) by mouth daily, Starting Thu 5/26/2022, Normal      famotidine (PEPCID) 20 mg tablet Take 1 tablet (20 mg total) by mouth 2 (two) times a day, Starting Fri 6/11/2021, Print      mirtazapine (REMERON) 7 5 MG tablet Take 1 tablet (7 5 mg total) by mouth daily at bedtime, Starting Wed 5/25/2022, Normal      naltrexone (REVIA) 50 mg tablet Take 0 5 tablets (25 mg total) by mouth daily, Starting Thu 5/26/2022, Normal      omeprazole (PriLOSEC) 20 mg delayed release capsule Take 1 capsule (20 mg total) by mouth 2 (two) times a day for 14 days, Starting Fri 6/11/2021, Until Fri 6/25/2021, Print      predniSONE 10 mg tablet Take 1 tablet (10 mg total) by mouth daily 40 mg daily x 4 days, 30 mg daily x 4 days, 20 mg daily x 4 days, 10 mg daily x 4 days, Starting Mon 7/11/2022, Normal      thiamine (VITAMIN B1) 100 mg tablet Take 1 tablet (100 mg total) by mouth daily, Starting Thu 5/26/2022, Normal             No discharge procedures on file      PDMP Review     None          ED Provider  Electronically Signed by           Otilio Hill MD  01/12/23 5189

## 2023-06-22 ENCOUNTER — HOSPITAL ENCOUNTER (EMERGENCY)
Facility: HOSPITAL | Age: 53
End: 2023-06-23
Attending: EMERGENCY MEDICINE
Payer: COMMERCIAL

## 2023-06-22 DIAGNOSIS — F29 PSYCHOSIS (HCC): Primary | ICD-10-CM

## 2023-06-22 DIAGNOSIS — Z00.8 MEDICAL CLEARANCE FOR PSYCHIATRIC ADMISSION: ICD-10-CM

## 2023-06-22 LAB
AMPHETAMINES SERPL QL SCN: NEGATIVE
BARBITURATES UR QL: NEGATIVE
BENZODIAZ UR QL: NEGATIVE
COCAINE UR QL: NEGATIVE
ETHANOL EXG-MCNC: 0 MG/DL
METHADONE UR QL: NEGATIVE
OPIATES UR QL SCN: NEGATIVE
OXYCODONE+OXYMORPHONE UR QL SCN: NEGATIVE
PCP UR QL: NEGATIVE
THC UR QL: NEGATIVE

## 2023-06-22 PROCEDURE — 80307 DRUG TEST PRSMV CHEM ANLYZR: CPT | Performed by: EMERGENCY MEDICINE

## 2023-06-22 PROCEDURE — 82075 ASSAY OF BREATH ETHANOL: CPT | Performed by: EMERGENCY MEDICINE

## 2023-06-22 PROCEDURE — 99284 EMERGENCY DEPT VISIT MOD MDM: CPT

## 2023-06-22 RX ORDER — TRANEXAMIC ACID 100 MG/ML
1000 INJECTION, SOLUTION INTRAVENOUS ONCE
Status: COMPLETED | OUTPATIENT
Start: 2023-06-23 | End: 2023-06-23

## 2023-06-22 NOTE — LETTER
[address]  [address]  [place] [address]  Dept: [phone]      EMTALA TRANSFER CONSENT    NAME Lowell Shaikh                           1970                              MRN [id number]    I have been informed of my rights regarding examination, treatment, and transfer   by Dr Ramona Bustamante att  providers found    Benefits: Specialized equipment and/or services available at the receiving facility (Include comment)________________________    Risks: Potential for delay in receiving treatment      Consent for Transfer:  I acknowledge that my medical condition has been evaluated and explained to me by the emergency department physician or other qualified medical person and/or my attending physician, who has recommended that I be transferred to the service of  Accepting Physician: Dr Padmini Sharma at [address] [facility] Name, [address] : [place] [facility], [address] [facility] , [facility] [place] [address]  The above potential benefits of such transfer, the potential risks associated with such transfer, and the probable risks of not being transferred have been explained to me, and I fully understand them  The doctor has explained that, in my case, the benefits of transfer outweigh the risks  I agree to be transferred  I authorize the performance of emergency medical procedures and treatments upon me in both transit and upon arrival at the receiving facility  Additionally, I authorize the release of any and all medical records to the receiving facility and request they be transported with me, if possible  I understand that the safest mode of transportation during a medical emergency is an ambulance and that the Hospital advocates the use of this mode of transport  Risks of traveling to the receiving facility by car, including absence of medical control, life sustaining equipment, such as oxygen, and medical personnel has been explained to me and I fully understand them      ([address] CORRECT BOX BELOW)  [X]  I consent to the stated transfer and to be transported by ambulance/helicopter  [  ]  I consent to the stated transfer, but refuse transportation by ambulance and accept full responsibility for my transportation by car  I understand the risks of non-ambulance transfers and I exonerate the Hospital and its staff from any deterioration in my condition that results from this refusal     X___________________________________________    DATE  23  TIME________  Signature of patient or legally responsible individual signing on patient behalf           RELATIONSHIP TO PATIENT_________________________                  Provider Certification    NAME Lyle Kraft                                1970                              MRN [id number]    A medical screening exam was performed on the above named patient  Based on the examination:    Condition Necessitating Transfer The primary encounter diagnosis was Psychosis ([address] )  A diagnosis of Medical clearance for psychiatric admission was also pertinent to this visit      Patient Condition: The patient has been stabilized such that within reasonable medical probability, no material deterioration of the patient condition or the condition of the unborn child(aye) is likely to result from the transfer    Reason for Transfer: Level of Care needed not available at this facility    Transfer Requirements: Facility [address], [address] [facility] , [address] [facility]   Space available and qualified personnel available for treatment as acknowledged by Elmore Cabot, [address], [phone]  Agreed to accept transfer and to provide appropriate medical treatment as acknowledged by       Dr Susan Gonzales  Appropriate medical records of the examination and treatment of the patient are provided at the time of transfer   [address] _______  Transfer will be performed by qualified personnel from [address] [place] [address]  and appropriate transfer equipment as required, including the use of necessary and appropriate life support measures  Provider Certification: I have examined the patient and explained the following risks and benefits of being transferred/refusing transfer to the patient/family:         Based on these reasonable risks and benefits to the patient and/or the unborn child(aye), and based upon the information available at the time of the patient’s examination, I certify that the medical benefits reasonably to be expected from the provision of appropriate medical treatments at another medical facility outweigh the increasing risks, if any, to the individual’s medical condition, and in the case of labor to the unborn child, from effecting the transfer      X____________________________________________ DATE 06/23/23        TIME_______      ORIGINAL - SEND TO MEDICAL RECORDS   COPY - SEND WITH PATIENT DURING TRANSFER

## 2023-06-23 ENCOUNTER — APPOINTMENT (EMERGENCY)
Dept: CT IMAGING | Facility: HOSPITAL | Age: 53
End: 2023-06-23
Payer: COMMERCIAL

## 2023-06-23 ENCOUNTER — HOSPITAL ENCOUNTER (INPATIENT)
Facility: HOSPITAL | Age: 53
LOS: 7 days | Discharge: HOME | End: 2023-06-30
Attending: STUDENT IN AN ORGANIZED HEALTH CARE EDUCATION/TRAINING PROGRAM | Admitting: STUDENT IN AN ORGANIZED HEALTH CARE EDUCATION/TRAINING PROGRAM
Payer: COMMERCIAL

## 2023-06-23 VITALS
OXYGEN SATURATION: 98 % | SYSTOLIC BLOOD PRESSURE: 138 MMHG | TEMPERATURE: 98.1 F | RESPIRATION RATE: 18 BRPM | DIASTOLIC BLOOD PRESSURE: 78 MMHG | HEART RATE: 87 BPM

## 2023-06-23 DIAGNOSIS — Z00.8 MEDICAL CLEARANCE FOR PSYCHIATRIC ADMISSION: ICD-10-CM

## 2023-06-23 DIAGNOSIS — R41.82 ALTERED MENTAL STATUS: ICD-10-CM

## 2023-06-23 DIAGNOSIS — F39 MOOD DISORDER (HCC): Primary | ICD-10-CM

## 2023-06-23 PROCEDURE — 70450 CT HEAD/BRAIN W/O DYE: CPT

## 2023-06-23 RX ORDER — LORAZEPAM 1 MG/1
1 TABLET ORAL
Status: DISCONTINUED | OUTPATIENT
Start: 2023-06-23 | End: 2023-06-30 | Stop reason: HOSPADM

## 2023-06-23 RX ORDER — HYDROXYZINE HYDROCHLORIDE 25 MG/1
25 TABLET, FILM COATED ORAL
Status: DISCONTINUED | OUTPATIENT
Start: 2023-06-23 | End: 2023-06-30 | Stop reason: HOSPADM

## 2023-06-23 RX ORDER — ACETAMINOPHEN 325 MG/1
975 TABLET ORAL EVERY 6 HOURS PRN
Status: DISCONTINUED | OUTPATIENT
Start: 2023-06-23 | End: 2023-06-30 | Stop reason: HOSPADM

## 2023-06-23 RX ORDER — HYDROXYZINE 50 MG/1
50 TABLET, FILM COATED ORAL
Status: DISCONTINUED | OUTPATIENT
Start: 2023-06-23 | End: 2023-06-30 | Stop reason: HOSPADM

## 2023-06-23 RX ORDER — HALOPERIDOL 5 MG/ML
5 INJECTION INTRAMUSCULAR
Status: DISCONTINUED | OUTPATIENT
Start: 2023-06-23 | End: 2023-06-30 | Stop reason: HOSPADM

## 2023-06-23 RX ORDER — HYDROXYZINE HYDROCHLORIDE 25 MG/1
25 TABLET, FILM COATED ORAL
Status: CANCELLED | OUTPATIENT
Start: 2023-06-23

## 2023-06-23 RX ORDER — HYDROXYZINE HYDROCHLORIDE 25 MG/1
50 TABLET, FILM COATED ORAL
Status: CANCELLED | OUTPATIENT
Start: 2023-06-23

## 2023-06-23 RX ORDER — RISPERIDONE 0.5 MG/1
0.5 TABLET ORAL
Status: DISCONTINUED | OUTPATIENT
Start: 2023-06-23 | End: 2023-06-30 | Stop reason: HOSPADM

## 2023-06-23 RX ORDER — DIAZEPAM 5 MG/1
5 TABLET ORAL ONCE
Status: DISCONTINUED | OUTPATIENT
Start: 2023-06-23 | End: 2023-06-23 | Stop reason: HOSPADM

## 2023-06-23 RX ORDER — TRAZODONE HYDROCHLORIDE 50 MG/1
50 TABLET ORAL
Status: DISCONTINUED | OUTPATIENT
Start: 2023-06-23 | End: 2023-06-26

## 2023-06-23 RX ORDER — ACETAMINOPHEN 325 MG/1
650 TABLET ORAL EVERY 4 HOURS PRN
Status: DISCONTINUED | OUTPATIENT
Start: 2023-06-23 | End: 2023-06-30 | Stop reason: HOSPADM

## 2023-06-23 RX ORDER — MELATONIN
1000 DAILY
Status: CANCELLED | OUTPATIENT
Start: 2023-06-23

## 2023-06-23 RX ORDER — MIRTAZAPINE 7.5 MG/1
7.5 TABLET, FILM COATED ORAL
Status: DISCONTINUED | OUTPATIENT
Start: 2023-06-23 | End: 2023-06-24

## 2023-06-23 RX ORDER — MELATONIN
1000 DAILY
Status: DISCONTINUED | OUTPATIENT
Start: 2023-06-24 | End: 2023-06-30 | Stop reason: HOSPADM

## 2023-06-23 RX ORDER — RISPERIDONE 1 MG/1
1 TABLET ORAL
Status: CANCELLED | OUTPATIENT
Start: 2023-06-23

## 2023-06-23 RX ORDER — OLANZAPINE 5 MG/1
5 TABLET, ORALLY DISINTEGRATING ORAL ONCE
Status: COMPLETED | OUTPATIENT
Start: 2023-06-23 | End: 2023-06-23

## 2023-06-23 RX ORDER — RISPERIDONE 0.25 MG/1
0.25 TABLET ORAL
Status: DISCONTINUED | OUTPATIENT
Start: 2023-06-23 | End: 2023-06-30 | Stop reason: HOSPADM

## 2023-06-23 RX ORDER — MIRTAZAPINE 15 MG/1
7.5 TABLET, FILM COATED ORAL
Status: CANCELLED | OUTPATIENT
Start: 2023-06-23

## 2023-06-23 RX ORDER — RISPERIDONE 1 MG/1
0.5 TABLET ORAL
Status: CANCELLED | OUTPATIENT
Start: 2023-06-23

## 2023-06-23 RX ORDER — RISPERIDONE 1 MG/1
1 TABLET ORAL
Status: DISCONTINUED | OUTPATIENT
Start: 2023-06-23 | End: 2023-06-30 | Stop reason: HOSPADM

## 2023-06-23 RX ORDER — ESCITALOPRAM OXALATE 10 MG/1
10 TABLET ORAL DAILY
Status: CANCELLED | OUTPATIENT
Start: 2023-06-23

## 2023-06-23 RX ORDER — DIAZEPAM 5 MG/1
5 TABLET ORAL ONCE
Status: COMPLETED | OUTPATIENT
Start: 2023-06-23 | End: 2023-06-23

## 2023-06-23 RX ORDER — LORAZEPAM 1 MG/1
1 TABLET ORAL
Status: CANCELLED | OUTPATIENT
Start: 2023-06-23

## 2023-06-23 RX ORDER — TRAZODONE HYDROCHLORIDE 50 MG/1
50 TABLET ORAL
Status: CANCELLED | OUTPATIENT
Start: 2023-06-23

## 2023-06-23 RX ORDER — LORAZEPAM 2 MG/ML
1 INJECTION INTRAMUSCULAR
Status: CANCELLED | OUTPATIENT
Start: 2023-06-23

## 2023-06-23 RX ORDER — HALOPERIDOL 5 MG/ML
5 INJECTION INTRAMUSCULAR
Status: CANCELLED | OUTPATIENT
Start: 2023-06-23

## 2023-06-23 RX ORDER — LORAZEPAM 2 MG/ML
1 INJECTION INTRAMUSCULAR
Status: DISCONTINUED | OUTPATIENT
Start: 2023-06-23 | End: 2023-06-30 | Stop reason: HOSPADM

## 2023-06-23 RX ORDER — ACETAMINOPHEN 325 MG/1
650 TABLET ORAL EVERY 4 HOURS PRN
Status: CANCELLED | OUTPATIENT
Start: 2023-06-23

## 2023-06-23 RX ORDER — RISPERIDONE 0.25 MG/1
0.25 TABLET ORAL
Status: CANCELLED | OUTPATIENT
Start: 2023-06-23

## 2023-06-23 RX ORDER — ESCITALOPRAM OXALATE 10 MG/1
10 TABLET ORAL DAILY
Status: DISCONTINUED | OUTPATIENT
Start: 2023-06-24 | End: 2023-06-28

## 2023-06-23 RX ORDER — ACETAMINOPHEN 325 MG/1
975 TABLET ORAL EVERY 6 HOURS PRN
Status: CANCELLED | OUTPATIENT
Start: 2023-06-23

## 2023-06-23 RX ADMIN — TRANEXAMIC ACID 1000 MG: 1 INJECTION, SOLUTION INTRAVENOUS at 01:17

## 2023-06-23 RX ADMIN — OLANZAPINE 5 MG: 5 TABLET, ORALLY DISINTEGRATING ORAL at 00:23

## 2023-06-23 RX ADMIN — DIAZEPAM 5 MG: 5 TABLET ORAL at 09:05

## 2023-06-23 RX ADMIN — MIRTAZAPINE 7.5 MG: 7.5 TABLET, FILM COATED ORAL at 21:12

## 2023-06-23 NOTE — ED NOTES
Provider at bedside wound care and dressing to left ear in progress, Pt tolerated well       Eros Painter, BETHEL  06/23/23 3952

## 2023-06-23 NOTE — NURSING NOTE
"Patient is a 201 status admission from [facility]  Upon assessment patient was guarded and gave limited responses to assessment questions  Patient stated \" I made a mistake at home, the  came and I walked outside and they cuffed me  I didn't do anything\"  Patient is preoccupied with being discharged tonight and requested a few times for a form to be discharged  Patient requested a 72 hour notice and signed the form at 0  Education was provided about 72 hour notice  Patient told staff he's refusing ordered EKG   Report from ED stated that patient was brought in by police for grabbing the steering wheel while in the car with family  Patient denies anxiety, depression, SI/HI/AH/VH  Since admission, patient has been withdrawn to room  Patient is able to make needs known  Safety checks ongoing    "

## 2023-06-23 NOTE — ED NOTES
CW received TT from [facility]    Pt has been accepted to [facility]  Under the care of Dr Evgeny Cortes    Pt may be picked up anytime after 1600    NURSE TO NURSE REPORT TO: [phone]    MYRON, YESSICA

## 2023-06-23 NOTE — ED NOTES
Provider at bedside to assess left ear re: dried blood to left outer ear     Junior David RN  06/22/23 0117

## 2023-06-23 NOTE — ED NOTES
CW notes, pt has signed 201 voluntary commitment  CW read and reviewed 201 rights with pt   Dr Angeles Sánchez has completed 12 and has denied the 302 initiated by Dr Landon Marrero     [address] sent clinicals to [address] [place] [address]

## 2023-06-23 NOTE — LETTER
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 Medications   Medication Dose Route Frequency Provider Last Rate    acetaminophen  650 mg Oral Q4H PRN Kusum Munda, CRNP      acetaminophen  650 mg Oral Q4H PRN Kusum Munda, CRNP      acetaminophen  975 mg Oral Q6H PRN Kusum Munda, CRNP      ARIPiprazole  5 mg Oral Daily Maren Patton MD      cholecalciferol  1,000 Units Oral Daily Kusum Munda, CRNP      cyanocobalamin  1,000 mcg Oral Daily Yudi Alcala PA-C      escitalopram  10 mg Oral Daily Kusum Munda, CRNP      haloperidol lactate  5 mg Intramuscular Q4H PRN Max 4/day Kusum Munda, CRNP      hydrOXYzine HCL  25 mg Oral Q6H PRN Max 4/day Kusum Munda, CRNP      hydrOXYzine HCL  50 mg Oral Q6H PRN Max 4/day Kusum Munda, CRNP      hydrOXYzine HCL  50 mg Oral HS PRN Kyaw Lewis MD      LORazepam  1 mg Intramuscular Q6H PRN Max 3/day Kusum Munda, CRNP      LORazepam  1 mg Oral Q6H PRN Max 3/day Kusum Munda, CRNP      risperiDONE  0 25 mg Oral Q4H PRN Max 6/day Kusum Munda, CRNP      risperiDONE  0 5 mg Oral Q4H PRN Max 3/day Kusum Munda, CRNP      risperiDONE  1 mg Oral Q2H "PRN Max 3/day CHELITA Jaramillo      traZODone  50 mg Oral HS Anastasio Landau, MD          PRN:    acetaminophen    acetaminophen    acetaminophen    haloperidol lactate    hydrOXYzine HCL    hydrOXYzine HCL    hydrOXYzine HCL    LORazepam    LORazepam    risperiDONE    risperiDONE    risperiDONE    - Observation: routine    - VS: as per unit protocol  - Diet: Regular diet  - Psychoeducation (benefits and potential risks) discussed, importance of compliance with the psychiatric treatment reiterated, and the patient verbalized understanding of the matter  - Encourage group attendance and milieu therapy    - The pt was educated and agreed to verbalize any suicidal thoughts, frustrations or concerns to the nursing staff, immediately  - Dispo: TBD       Next of Kin  Extended Emergency Contact Information  Primary Emergency Contact: [address] [facility]  Mobile Phone: [phone]  Relation: Daughter  Secondary Emergency Contact: [address] [facility]  Mobile Phone: [phone]  Relation: Spouse      Counseling / Coordination of Care  Patient's progress discussed with staff in treatment team meeting  Medications, treatment progress and treatment plan reviewed with patient  Medication changes discussed with patient  Coping with anxiety reviewed with patient  Coping skills reviewed with patient  Educated on importance of medication and treatment compliance  Supportive therapy provided to patient  Cognitive techniques utilized during the session  Reassurance and supportive therapy provided  Encouraged participation in milieu and group therapy on the unit       Anastasio Landau, MD  Attending Psychiatrist   [address] [place] [address]  6/25/2023  4:11 PM  Attested  Progress Note - [address] [place] [address] 46 y o  male MRN: [id number]  Unit/Bed#Nelsy Melendez 738-83 Encounter: [id number]      Behavior over the last 24 hours:  unchanged    Subjective: I saw Carlos \"Neel\" " "for follow up and continuation of care  I have reviewed the chart and discussed progress with the nursing staff  Patient is visible, withdrawn to self, paces the halls, and did not sleep at all last night  He is seen at the nurses station and phone frequently  He denies depression, anxiety, SI, HI, AVH  He is medication and meal compliant today but refused Abilify yesterday morning  He is not attending groups  He remains in good behavorial control  No PRNs in the last 24 hours  72 hour notice expires tomorrow 6/26 at 1809    On assessment, Gunjan Timmons is seen ambulating the halls, appears anxious, disorganized and requests to speak with this provider  He is preoccupied with signing himself out wanting to leave today but was reminded of 72 hour notice expires tomorrow  He reports missing his family  He lacks insight in to his admission, \"I shouldn't be here\" and minimizes his symptoms  When reviewing staff report that he did not sleep at all, he denies feeling tired and declines HS medication offered to assist with sleep promotion \"I don't want to be on any medication\"  He denies anxiety though appears anxious, denies depression, SI, HI, plan, intent or self-injurious behaviors  Gunjan Timmons appears paranoid, preoccupied but denies A/VH         Psychiatric Review of Systems:  Sleep: insomnia  Appetite: normal  Medication side effects: No   ROS: no complaints, all other systems are negative    Mental Status Evaluation:    Appearance:  age appropriate, casually dressed, dressed appropriately, no distress   Behavior:  cooperative, guarded, limited eye contact, restless and fidgety   Speech:  normal rate, normal volume, normal pitch, scant   Mood:  anxious   Affect:  mood-congruent   Thought Process:  coherent, perseverative   Thought Content:  mild paranoia, no overt delusions, preoccupied with discharge   Perceptual Disturbances: no auditory hallucinations, no visual hallucinations, appears preoccupied   Risk Potential: Suicidal " ideation - None at present  Homicidal ideation - None  Potential for aggression - Not at present   Memory:  recent memory impaired, remote memory intact   Consciousness:  alert and awake   Attention: attention span and concentration are age appropriate   Insight:  poor   Judgment: limited   Gait/Station: normal gait/station   Motor Activity: no abnormal movements     Progress Toward Goals: no significant improvement today, still very anxious, still disorganized, slightly paranoid, poor insight  No significant changes in behaviors or clinical status over the last 24 hours      Discharge Disposition: TBD    Assessment/Plan   Principal Problem:    Mood disorder ([facility] [address] )  Active Problems:    Medical clearance for psychiatric admission    GERD (gastroesophageal reflux disease)    Cognitive dysfunction      Treatment Plan:  Continue with group therapy, individual therapy and goals for admission  Behavioral Health checks every 7 minutes for safety  Observe progress over weekend  No changes, continue current medications:      Current Facility-Administered Medications   Medication Dose Route Frequency Provider Last Rate    acetaminophen  650 mg Oral Q4H PRN Lyndsay Reinier, CRNP      acetaminophen  650 mg Oral Q4H PRN Lyndsay Reinier, CRNP      acetaminophen  975 mg Oral Q6H PRN Lyndsay Reinier, CRNP      ARIPiprazole  5 mg Oral Daily Layo Rush MD      cholecalciferol  1,000 Units Oral Daily Lyndsay Reinier, CRNP      escitalopram  10 mg Oral Daily Lyndsay Reinier, CRNP      haloperidol lactate  5 mg Intramuscular Q4H PRN Max 4/day Lyndsay Reinier, CRNP      hydrOXYzine HCL  25 mg Oral Q6H PRN Max 4/day Lyndsay Reinier, CRNP      hydrOXYzine HCL  50 mg Oral Q6H PRN Max 4/day Lyndsay Reinier, CRNP      LORazepam  1 mg Intramuscular Q6H PRN Max 3/day Lyndsay Reinier, CRNP      LORazepam  1 mg Oral Q6H PRN Max 3/day Lyndsay Reinier, CRNP      risperiDONE  0 25 mg Oral Q4H PRN Max 6/day Lyndsay Reinier, CRNP      risperiDONE 0 5 mg Oral Q4H PRN Max 3/day Lopez Lacer, CRNP      risperiDONE  1 mg Oral Q2H PRN Max 3/day Lopez Lacer, CRNP      traZODone  50 mg Oral HS PRN Lopez Lacer, CRNP         Vitals:  Vitals:    06/25/23 0722   BP: 135/77   Pulse: 83   Resp: 16   Temp: 97 6 °F (36 4 °C)   SpO2: 96%       Laboratory Results:    I have personally reviewed all pertinent laboratory/tests results    Labs in last 72 hours:   Recent Labs     06/24/23  0530   WBC 9 35   RBC 5 54   HGB 16 2   HCT 48 8      RDW 12 7   NEUTROABS 5 34   SODIUM 138   K 3 8      CO2 25   BUN 17   CREATININE 1 01   GLUC 114   GLUF 114*   CALCIUM 9 5   AST 18   ALT 28   ALKPHOS 97   TP 7 3   ALB 4 4   TBILI 1 35*   [id number] 1 727     Admission Labs:   Admission on 06/23/2023   Component Date Value    Sodium 06/24/2023 138     Potassium 06/24/2023 3 8     Chloride 06/24/2023 103     CO2 06/24/2023 25     ANION GAP 06/24/2023 10     BUN 06/24/2023 17     Creatinine 06/24/2023 1 01     Glucose 06/24/2023 114     Glucose, Fasting 06/24/2023 114 (H)     Calcium 06/24/2023 9 5     AST 06/24/2023 18     ALT 06/24/2023 28     Alkaline Phosphatase 06/24/2023 97     Total Protein 06/24/2023 7 3     Albumin 06/24/2023 4 4     Total Bilirubin 06/24/2023 1 35 (H)     eGFR 06/24/2023 83     Magnesium 06/24/2023 2 2     WBC 06/24/2023 9 35     RBC 06/24/2023 5 54     Hemoglobin 06/24/2023 16 2     Hematocrit 06/24/2023 48 8     MCV 06/24/2023 88     MCH 06/24/2023 29 2     MCHC 06/24/2023 33 2     RDW 06/24/2023 12 7     MPV 06/24/2023 11 2     Platelets 19/04/5824 237     nRBC 06/24/2023 0     Neutrophils Relative 06/24/2023 56     Immat GRANS % 06/24/2023 0     Lymphocytes Relative 06/24/2023 31     Monocytes Relative 06/24/2023 10     Eosinophils Relative 06/24/2023 2     Basophils Relative 06/24/2023 1     Neutrophils Absolute 06/24/2023 5 34     Immature Grans Absolute 06/24/2023 0 03     Lymphocytes Absolute 06/24/2023 2 88     Monocytes Absolute 06/24/2023 0 90     Eosinophils Absolute 06/24/2023 0 15     Basophils Absolute 06/24/2023 0 05     Folate 06/24/2023 >22 3     TSH 3RD GENERATON 06/24/2023 1  727     Vitamin B-12 06/24/2023 210     Vit D, 25-Hydroxy 06/24/2023 23 1 (L)     POC Glucose 06/25/2023 123          Risks / Benefits of Treatment:    Risks, benefits, and possible side effects of medications explained to patient  Patient has limited understanding of risks and benefits of treatment at this time, but agrees to take medications as prescribed  Counseling / Coordination of Care: Total floor / unit time spent today 30 minutes  Greater than 50% of total time was spent with the patient and / or family counseling and / or coordination of care  A description of the counseling / coordination of care:   Patient's presentation on admission and proposed treatment plan discussed with treatment team   Diagnosis, medication changes and treatment plan reviewed with patient  Importance of medication and treatment compliance reviewed with patient  Supportive therapy provided to patient  This note has been constructed using a voice recognition system  There may be translation, syntax, or grammatical errors  If you have any questions, please contact the dictating author  Helen Alvarez [place], [facility]  06/25/23    Attestation signed by Krys Kurtz MD at 6/25/2023  5:46 PM:  I was the supervising/collaborating physician on pt  I acknowledge the AP's documentation and services provided  I was available by phone, if needed, for consultation      Krys Kurtz MD 06/25/23

## 2023-06-23 NOTE — ED NOTES
"PT states \"I feel a little anxious  PT refuses to answer any other questions at this time  \"      Reinier Villafuerte RN  06/22/23 8549    "

## 2023-06-23 NOTE — ED NOTES
CW received TT from [address] [place] [address] Ne  Pt will be picked up at [address] by CTS      CW provided pt w/updates    CW provided INTAKE w/updates    TDS, CW

## 2023-06-23 NOTE — ED NOTES
Pt observed in [address] [place] [address], awake, pacing within assigned room and denney before room  Offered food, fluids, and something for discomfort, pt refused  Pt inquired about his personal belongings  Belongings reviewed(of what is documented in Epic) with Pt   Pt was reassured belongings was placed in pt locker  Pt was redirected to bed, try and rest until seen by Crisis in the a watson Junior RN  06/23/23 [address] [facility], RN  06/23/23 0687

## 2023-06-23 NOTE — ED NOTES
Pt escorted to [facility] via Security x2, pt is cooperative at present       [facility] , RN  06/23/23 1413

## 2023-06-23 NOTE — ED NOTES
CW is observed to be speaking w/wife on the phone at this time  Pt appears to be sitting on pt bed and speaking calmly      TDS, CW

## 2023-06-23 NOTE — ED NOTES
CW prepared transportation packet  EMTALA has been completed  Pt did provide this writer w/verbal consent to contact pt's wife and provide updates  CW placed call to pt's wife and provided accepting facility details  Pt's wife is on way to ED to drop off personal items      TDS, CW
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
## 2023-06-23 NOTE — ED NOTES
CW faxed converted 302 documents to [facility] office    TDS, CW

## 2023-06-23 NOTE — ED NOTES
[address] placed call to [facility], [phone], spoke w/Hawi and transferred to [facility], [facility] w/Jina directed to call: [facility] - [phone], spoke w/Naveed at [phone]  Gavino Terry transferred this writer to the authorization department, Lisa Correia and transferred to [employer]          As per [employer]: Pt's ID #: [id number]    Insurance Authorization for admission:   Phone call placed to [facility]   Phone number: [phone]     Spoke to [employer]     4 days approved  Level of care: 201  Review on 6/26/2023 - UR to contact Beryle Calkin at [phone]  Authorization # [id number]        EVS (Eligibility Verification System) called - [phone]  Automated system indicates: **    Insurance Authorization for Transportation:    Phone call placed to **  Phone number **  Spoke to **     Authorization #: **    YESSICA SANCHES
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
## 2023-06-23 NOTE — PLAN OF CARE
Problem: Ineffective Coping  Goal: Cooperates with admission process  Description: Interventions:   - Complete admission process  Outcome: Not Progressing  Goal: Identifies ineffective coping skills  Outcome: Not Progressing  Goal: Identifies healthy coping skills  Outcome: Not Progressing  Goal: Demonstrates healthy coping skills  Outcome: Not Progressing  Goal: Patient/Family participate in treatment and DC plans  Description: Interventions:  - Provide therapeutic environment  Outcome: Not Progressing  Goal: Patient/Family verbalizes awareness of resources  Outcome: Not Progressing  Goal: Understands least restrictive measures  Description: Interventions:  - Utilize least restrictive behavior  Outcome: Not Progressing  Goal: Free from restraint events  Description: - Utilize least restrictive measures   - Provide behavioral interventions   - Redirect inappropriate behaviors   Outcome: Not Progressing     Problem: Risk for Self Injury/Neglect  Goal: Treatment Goal: Remain safe during length of stay, learn and adopt new coping skills, and be free of self-injurious ideation, impulses and acts at the time of discharge  Outcome: Not Progressing  Goal: Verbalize thoughts and feelings  Description: Interventions:  - Assess and re-assess patient's lethality and potential for self-injury  - Engage patient in 1:1 interactions, daily, for a minimum of 15 minutes  - Encourage patient to express feelings, fears, frustrations, hopes  - Establish rapport/trust with patient   Outcome: Not Progressing  Goal: Refrain from harming self  Description: Interventions:  - Monitor patient closely, per order  - Develop a trusting relationship  - Supervise medication ingestion, monitor effects and side effects   Outcome: Not Progressing  Goal: Attend and participate in unit activities, including therapeutic, recreational, and educational groups  Description: Interventions:  - Provide therapeutic and educational activities daily, encourage attendance and participation, and document same in the medical record  - Obtain collateral information, encourage visitation and family involvement in care   Outcome: Not Progressing  Goal: Recognize maladaptive responses and adopt new coping mechanisms  Outcome: Not Progressing  Goal: Complete daily ADLs, including personal hygiene independently, as able  Description: Interventions:  - Observe, teach, and assist patient with ADLS  - Monitor and promote a balance of rest/activity, with adequate nutrition and elimination  Outcome: Not Progressing     Problem: Depression  Goal: Treatment Goal: Demonstrate behavioral control of depressive symptoms, verbalize feelings of improved mood/affect, and adopt new coping skills prior to discharge  Outcome: Not Progressing  Goal: Verbalize thoughts and feelings  Description: Interventions:  - Assess and re-assess patient's level of risk   - Engage patient in 1:1 interactions, daily, for a minimum of 15 minutes   - Encourage patient to express feelings, fears, frustrations, hopes   Outcome: Not Progressing  Goal: Refrain from harming self  Description: Interventions:  - Monitor patient closely, per order   - Supervise medication ingestion, monitor effects and side effects   Outcome: Not Progressing  Goal: Refrain from isolation  Description: Interventions:  - Develop a trusting relationship   - Encourage socialization   Outcome: Not Progressing  Goal: Refrain from self-neglect  Outcome: Not Progressing  Goal: Attend and participate in unit activities, including therapeutic, recreational, and educational groups  Description: Interventions:  - Provide therapeutic and educational activities daily, encourage attendance and participation, and document same in the medical record   Outcome: Not Progressing  Goal: Complete daily ADLs, including personal hygiene independently, as able  Description: Interventions:  - Observe, teach, and assist patient with ADLS  -  Monitor and promote a balance of rest/activity, with adequate nutrition and elimination   Outcome: Not Progressing     Problem: Anxiety  Goal: Anxiety is at manageable level  Description: Interventions:  - Assess and monitor patient's anxiety level  - Monitor for signs and symptoms (heart palpitations, chest pain, shortness of breath, headaches, nausea, feeling jumpy, restlessness, irritable, apprehensive)  - Collaborate with interdisciplinary team and initiate plan and interventions as ordered    - [place] patient to unit/surroundings  - Explain treatment plan  - Encourage participation in care  - Encourage verbalization of concerns/fears  - Identify coping mechanisms  - Assist in developing anxiety-reducing skills  - Administer/offer alternative therapies  - Limit or eliminate stimulants  Outcome: Not Progressing

## 2023-06-23 NOTE — ED NOTES
Spoke to patients wife Anna Michel to see if she is okay with the patient calling her this morning, per Anna Michel patient is allowed to call her if he requests to     Sheng Cantu RN  06/23/23 [id number] [facility] BETHEL Santillan  06/23/23 [id number]

## 2023-06-24 PROBLEM — K21.9 GERD (GASTROESOPHAGEAL REFLUX DISEASE): Status: ACTIVE | Noted: 2023-06-24

## 2023-06-24 PROBLEM — Z00.8 MEDICAL CLEARANCE FOR PSYCHIATRIC ADMISSION: Status: ACTIVE | Noted: 2023-06-24

## 2023-06-24 PROBLEM — F09 COGNITIVE DYSFUNCTION: Status: ACTIVE | Noted: 2023-06-24

## 2023-06-24 PROBLEM — F39 MOOD DISORDER (HCC): Status: ACTIVE | Noted: 2023-06-24

## 2023-06-24 LAB
25(OH)D3 SERPL-MCNC: 23.1 NG/ML (ref 30–100)
ALBUMIN SERPL BCP-MCNC: 4.4 G/DL (ref 3.5–5)
ALP SERPL-CCNC: 97 U/L (ref 34–104)
ALT SERPL W P-5'-P-CCNC: 28 U/L (ref 7–52)
ANION GAP SERPL CALCULATED.3IONS-SCNC: 10 MMOL/L
AST SERPL W P-5'-P-CCNC: 18 U/L (ref 13–39)
BASOPHILS # BLD AUTO: 0.05 THOUSANDS/ÂΜL (ref 0–0.1)
BASOPHILS NFR BLD AUTO: 1 % (ref 0–1)
BILIRUB SERPL-MCNC: 1.35 MG/DL (ref 0.2–1)
BUN SERPL-MCNC: 17 MG/DL (ref 5–25)
CALCIUM SERPL-MCNC: 9.5 MG/DL (ref 8.4–10.2)
CHLORIDE SERPL-SCNC: 103 MMOL/L (ref 96–108)
CO2 SERPL-SCNC: 25 MMOL/L (ref 21–32)
CREAT SERPL-MCNC: 1.01 MG/DL (ref 0.6–1.3)
EOSINOPHIL # BLD AUTO: 0.15 THOUSAND/ÂΜL (ref 0–0.61)
EOSINOPHIL NFR BLD AUTO: 2 % (ref 0–6)
ERYTHROCYTE [DISTWIDTH] IN BLOOD BY AUTOMATED COUNT: 12.7 % (ref 11.6–15.1)
FOLATE SERPL-MCNC: >22.3 NG/ML
GFR SERPL CREATININE-BSD FRML MDRD: 83 ML/MIN/1.73SQ M
GLUCOSE P FAST SERPL-MCNC: 114 MG/DL (ref 65–99)
GLUCOSE SERPL-MCNC: 114 MG/DL (ref 65–140)
HCT VFR BLD AUTO: 48.8 % (ref 36.5–49.3)
HGB BLD-MCNC: 16.2 G/DL (ref 12–17)
IMM GRANULOCYTES # BLD AUTO: 0.03 THOUSAND/UL (ref 0–0.2)
IMM GRANULOCYTES NFR BLD AUTO: 0 % (ref 0–2)
LYMPHOCYTES # BLD AUTO: 2.88 THOUSANDS/ÂΜL (ref 0.6–4.47)
LYMPHOCYTES NFR BLD AUTO: 31 % (ref 14–44)
MAGNESIUM SERPL-MCNC: 2.2 MG/DL (ref 1.9–2.7)
MCH RBC QN AUTO: 29.2 PG (ref 26.8–34.3)
MCHC RBC AUTO-ENTMCNC: 33.2 G/DL (ref 31.4–37.4)
MCV RBC AUTO: 88 FL (ref 82–98)
MONOCYTES # BLD AUTO: 0.9 THOUSAND/ÂΜL (ref 0.17–1.22)
MONOCYTES NFR BLD AUTO: 10 % (ref 4–12)
NEUTROPHILS # BLD AUTO: 5.34 THOUSANDS/ÂΜL (ref 1.85–7.62)
NEUTS SEG NFR BLD AUTO: 56 % (ref 43–75)
NRBC BLD AUTO-RTO: 0 /100 WBCS
PLATELET # BLD AUTO: 237 THOUSANDS/UL (ref 149–390)
PMV BLD AUTO: 11.2 FL (ref 8.9–12.7)
POTASSIUM SERPL-SCNC: 3.8 MMOL/L (ref 3.5–5.3)
PROT SERPL-MCNC: 7.3 G/DL (ref 6.4–8.4)
RBC # BLD AUTO: 5.54 MILLION/UL (ref 3.88–5.62)
SODIUM SERPL-SCNC: 138 MMOL/L (ref 135–147)
TSH SERPL DL<=0.05 MIU/L-ACNC: 1.73 UIU/ML (ref 0.45–4.5)
VIT B12 SERPL-MCNC: 210 PG/ML (ref 180–914)
WBC # BLD AUTO: 9.35 THOUSAND/UL (ref 4.31–10.16)

## 2023-06-24 PROCEDURE — 82306 VITAMIN D 25 HYDROXY: CPT | Performed by: STUDENT IN AN ORGANIZED HEALTH CARE EDUCATION/TRAINING PROGRAM

## 2023-06-24 PROCEDURE — 99223 1ST HOSP IP/OBS HIGH 75: CPT | Performed by: STUDENT IN AN ORGANIZED HEALTH CARE EDUCATION/TRAINING PROGRAM

## 2023-06-24 PROCEDURE — 83735 ASSAY OF MAGNESIUM: CPT

## 2023-06-24 PROCEDURE — 80053 COMPREHEN METABOLIC PANEL: CPT

## 2023-06-24 PROCEDURE — 84443 ASSAY THYROID STIM HORMONE: CPT | Performed by: STUDENT IN AN ORGANIZED HEALTH CARE EDUCATION/TRAINING PROGRAM

## 2023-06-24 PROCEDURE — 82746 ASSAY OF FOLIC ACID SERUM: CPT | Performed by: STUDENT IN AN ORGANIZED HEALTH CARE EDUCATION/TRAINING PROGRAM

## 2023-06-24 PROCEDURE — 82607 VITAMIN B-12: CPT | Performed by: STUDENT IN AN ORGANIZED HEALTH CARE EDUCATION/TRAINING PROGRAM

## 2023-06-24 PROCEDURE — 85025 COMPLETE CBC W/AUTO DIFF WBC: CPT

## 2023-06-24 RX ORDER — ARIPIPRAZOLE 5 MG/1
5 TABLET ORAL DAILY
Status: DISCONTINUED | OUTPATIENT
Start: 2023-06-24 | End: 2023-06-27

## 2023-06-24 RX ORDER — FLUTICASONE PROPIONATE 50 MCG
1 SPRAY, SUSPENSION (ML) NASAL DAILY
Status: DISCONTINUED | OUTPATIENT
Start: 2023-06-24 | End: 2023-06-24

## 2023-06-24 RX ADMIN — CHOLECALCIFEROL TAB 25 MCG (1000 UNIT) 1000 UNITS: 25 TAB at 08:33

## 2023-06-24 RX ADMIN — ESCITALOPRAM OXALATE 10 MG: 10 TABLET ORAL at 08:33

## 2023-06-24 NOTE — NURSING NOTE
"Patient visible on unit pacing halls  No interaction with peers  Minimal interaction with assessment  Denies all s/s at this time stating \"I don't need to be here  \" Compliant with meals and medications  Safety checks ongoing     "

## 2023-06-24 NOTE — CONSULTS
"[address] [facility]  Consult  Name: Blair Broussard 46 y o  male I MRN: [id number]  Unit/Bed#: Roselyn Sauer 796-03 I Date of Admission: 2023   Date of Service: 2023 I Hospital Day: 1    Inpatient consult for Medical Clearance for [facility] patient  Consult performed by: Bernbae Red PA-C  Consult ordered by: CHELITA Feng        Assessment/Plan   Medical clearance for psychiatric admission  Assessment & Plan  • At this time, patient appears medically stable to continue inpatient psychiatric management  • Current labs, nursing notes and imaging reviewed  o Pending Labs: Vit B12, folate, & Vit D   • Recent EC2023 NSR; ECG today pending   o Will request SLIM to f/u tomorrow     Mood disorder [facility])  Assessment & Plan  · Management per primary team   · Presented to [facility] ED in police custody after argument w/ wife     Cognitive dysfunction  Assessment & Plan  · Per neuropsych's note in 2022:   · \"Neuropsychological Exam revealed diffuse cognitive dysfunction and on a measure assessing awareness of personal health status and ability to evaluate health problems, handle medical emergencies and take safety precautions, patient performed in the IMPAIRED range of functioning  At this time, patient does not appear to have capacity to make fully informed medical decisions  \"      GERD (gastroesophageal reflux disease)  Assessment & Plan  · Denies symptoms   · Pt is non compliant w/ home PPI or H2 therapy     Recommendations for Discharge:  · SLIM will sign off - please call with questions or concerns  · Follow up with PCP upon discharge  Counseling / Coordination of Care Time: 30 minutes  Greater than 50% of total time spent on patient counseling and coordination of care  Collaboration of Care:  Were Recommendations Directly Discussed with Primary Treatment Team? - Yes     History of Present Illness:    Blair Broussard is a 46 y o  male who is originally admitted to the psychiatric " "service due to agitation and erratic behaviors   Pt presented to [facility] ED in police custody after argument w/ wife  Pt has a documented h/o cognitive impairment w/ incompetency  We are consulted for medical clearance for psychiatric hospitalization and medical management  The pt is very guarded on exam  He expresses no acute complaints  He states \"I need   \"  He denies CP, SOB, JEWELL, dysuria, abd pain, or N/V  Review of Systems:    Review of Systems   Constitutional: Negative  HENT: Negative for congestion, sinus pain and trouble swallowing  Eyes: Negative  Respiratory: Negative for cough, shortness of breath and wheezing  Cardiovascular: Negative for chest pain, palpitations and leg swelling  Gastrointestinal: Negative for abdominal distention, abdominal pain, constipation, diarrhea and nausea  Genitourinary: Negative for difficulty urinating and dysuria  Musculoskeletal: Negative for arthralgias and myalgias  Neurological: Negative for dizziness and headaches  Psychiatric/Behavioral: Negative for agitation and hallucinations  The patient is not nervous/anxious  Past Medical and Surgical History:     Past Medical History:   Diagnosis Date   • Anxiety    • ETOH abuse    • Nasal congestion        No past surgical history on file      Meds/Allergies:    all medications and allergies reviewed    Allergies: No Known Allergies    Social History:     Marital Status: /Civil Union    Substance Use History:   Social History     Substance and Sexual Activity   Alcohol Use Not Currently    Comment: Pt denies use of ETOH at this time     Social History     Tobacco Use   Smoking Status Former   • Types: Cigarettes   • Passive exposure: Never   Smokeless Tobacco Never     Social History     Substance and Sexual Activity   Drug Use Never       Family History:    non-contributory    Physical Exam:     Vitals:   Blood Pressure: 132/93 (06/24/23 0753)  Pulse: 84 (06/24/23 " "0753)  Temperature: 97 5 °F (36 4 °C) (06/24/23 0753)  Temp Source: Temporal (06/24/23 0753)  Respirations: 16 (06/24/23 0753)  Height: 5' 7\" (170 2 cm) (06/23/23 1754)  Weight - Scale: 95 9 kg (211 lb 6 7 oz) (06/23/23 1754)  SpO2: 94 % (06/24/23 0753)    Physical Exam  Constitutional:       Appearance: Normal appearance  He is normal weight  HENT:      Head: Normocephalic  Nose: Nose normal       Mouth/Throat:      Mouth: Mucous membranes are moist       Pharynx: Oropharynx is clear  Eyes:      Extraocular Movements: Extraocular movements intact  Conjunctiva/sclera: Conjunctivae normal    Cardiovascular:      Rate and Rhythm: Normal rate and regular rhythm  Pulses: Normal pulses  Heart sounds: Normal heart sounds  Pulmonary:      Effort: Pulmonary effort is normal       Breath sounds: Normal breath sounds  Abdominal:      General: Abdomen is flat  Bowel sounds are normal  There is no distension  Palpations: Abdomen is soft  There is no mass  Tenderness: There is no abdominal tenderness  Musculoskeletal:         General: No swelling or tenderness  Normal range of motion  Cervical back: Normal range of motion and neck supple  No tenderness  Skin:     General: Skin is warm and dry  Neurological:      General: No focal deficit present  Mental Status: He is alert     Psychiatric:      Comments: Flat effect; guarded          Additional Data:     Lab Results:     Results from last 7 days   Lab Units 06/24/23  0530   WBC Thousand/uL 9 35   HEMOGLOBIN g/dL 16 2   HEMATOCRIT % 48 8   PLATELETS Thousands/uL 237   NEUTROS PCT % 56   LYMPHS PCT % 31   MONOS PCT % 10   EOS PCT % 2     Results from last 7 days   Lab Units 06/24/23  0530   SODIUM mmol/L 138   POTASSIUM mmol/L 3 8   CHLORIDE mmol/L 103   CO2 mmol/L 25   BUN mg/dL 17   CREATININE mg/dL 1 01   ANION GAP mmol/L 10   CALCIUM mg/dL 9 5   ALBUMIN g/dL 4 4   TOTAL BILIRUBIN mg/dL 1 35*   ALK PHOS U/L 97   ALT U/L 28 " "  AST U/L 18   GLUCOSE RANDOM mg/dL 114             No results found for: \"HGBA1C\"            Imaging: I have personally reviewed pertinent reports  No orders to display       EKG, Pathology, and Other Studies Reviewed on Admission:   · EKG: see above documentation    ** Please Note: This note has been constructed using a voice recognition system   **       "

## 2023-06-24 NOTE — PLAN OF CARE
Problem: Ineffective Coping  Goal: Cooperates with admission process  Description: Interventions:   - Complete admission process  Outcome: Progressing  Goal: Identifies ineffective coping skills  Outcome: Progressing  Goal: Identifies healthy coping skills  Outcome: Progressing  Goal: Demonstrates healthy coping skills  Outcome: Progressing  Goal: Patient/Family participate in treatment and DC plans  Description: Interventions:  - Provide therapeutic environment  Outcome: Progressing  Goal: Patient/Family verbalizes awareness of resources  Outcome: Progressing  Goal: Understands least restrictive measures  Description: Interventions:  - Utilize least restrictive behavior  Outcome: Progressing  Goal: Free from restraint events  Description: - Utilize least restrictive measures   - Provide behavioral interventions   - Redirect inappropriate behaviors   Outcome: Progressing     Problem: Risk for Self Injury/Neglect  Goal: Treatment Goal: Remain safe during length of stay, learn and adopt new coping skills, and be free of self-injurious ideation, impulses and acts at the time of discharge  Outcome: Progressing  Goal: Verbalize thoughts and feelings  Description: Interventions:  - Assess and re-assess patient's lethality and potential for self-injury  - Engage patient in 1:1 interactions, daily, for a minimum of 15 minutes  - Encourage patient to express feelings, fears, frustrations, hopes  - Establish rapport/trust with patient   Outcome: Progressing  Goal: Refrain from harming self  Description: Interventions:  - Monitor patient closely, per order  - Develop a trusting relationship  - Supervise medication ingestion, monitor effects and side effects   Outcome: Progressing  Goal: Recognize maladaptive responses and adopt new coping mechanisms  Outcome: Progressing  Goal: Complete daily ADLs, including personal hygiene independently, as able  Description: Interventions:  - Observe, teach, and assist patient with ADLS  - Monitor and promote a balance of rest/activity, with adequate nutrition and elimination  Outcome: Progressing     Problem: Depression  Goal: Treatment Goal: Demonstrate behavioral control of depressive symptoms, verbalize feelings of improved mood/affect, and adopt new coping skills prior to discharge  Outcome: Progressing  Goal: Verbalize thoughts and feelings  Description: Interventions:  - Assess and re-assess patient's level of risk   - Engage patient in 1:1 interactions, daily, for a minimum of 15 minutes   - Encourage patient to express feelings, fears, frustrations, hopes   Outcome: Progressing  Goal: Refrain from harming self  Description: Interventions:  - Monitor patient closely, per order   - Supervise medication ingestion, monitor effects and side effects   Outcome: Progressing  Goal: Refrain from isolation  Description: Interventions:  - Develop a trusting relationship   - Encourage socialization   Outcome: Progressing  Goal: Refrain from self-neglect  Outcome: Progressing  Goal: Complete daily ADLs, including personal hygiene independently, as able  Description: Interventions:  - Observe, teach, and assist patient with ADLS  -  Monitor and promote a balance of rest/activity, with adequate nutrition and elimination   Outcome: Progressing     Problem: Anxiety  Goal: Anxiety is at manageable level  Description: Interventions:  - Assess and monitor patient's anxiety level  - Monitor for signs and symptoms (heart palpitations, chest pain, shortness of breath, headaches, nausea, feeling jumpy, restlessness, irritable, apprehensive)  - Collaborate with interdisciplinary team and initiate plan and interventions as ordered    - [place] patient to unit/surroundings  - Explain treatment plan  - Encourage participation in care  - Encourage verbalization of concerns/fears  - Identify coping mechanisms  - Assist in developing anxiety-reducing skills  - Administer/offer alternative therapies  - Limit or eliminate stimulants  Outcome: Progressing

## 2023-06-24 NOTE — TREATMENT PLAN
TREATMENT PLAN REVIEW - [id number] Hancock 46 y o  1970 male MRN: [id number]    [address] [address] Evelyn Partida [address] [facility] Room / Bed: Sofia Bell [facility]/-03 Encounter: [id number]          Admit Date/Time:  6/23/2023  5:52 PM    Treatment Team: Attending Provider: José Miguel Martisn MD; Patient Care Assistant: Irvin Randle; Registered Nurse: Vivian Sosa RN; Patient Care Assistant: Elisa More; Patient Care Assistant: Gilberto Oakes; Patient Care Assistant: Lg Jane; Patient Care Assistant: Michael Smith    Diagnosis: Principal Problem:    Mood disorder [facility]  Active Problems:    Medical clearance for psychiatric admission    GERD (gastroesophageal reflux disease)    Cognitive dysfunction      Patient Strengths/Assets: negotiates basic needs, patient is on a voluntary commitment    Patient Barriers/Limitations: difficulty adapting, poor reasoning ability, poor self-care, resistance to treatment    Short Term Goals: decrease in depressive symptoms, decrease in anxiety symptoms, decrease in paranoid thoughts, improvement in reasoning ability, improvement in self care    Long Term Goals: improvement in depression, improvement in anxiety, free of suicidal thoughts, able to express basic needs, acceptance of need for psychiatric medications, adequate self care, adequate sleep, adequate appetite, adequate oral intake, appropriate interaction with peers, appropriate interaction with family    Progress Towards Goals: starting psychiatric medications as prescribed    Recommended Treatment: medication management, patient medication education, group therapy, milieu therapy, continued Behavioral Health psychiatric evaluation/assessment process    Treatment Frequency: daily medication monitoring, group and milieu therapy daily, monitoring through interdisciplinary rounds, monitoring through weekly patient care conferences    Expected Discharge Date: to be determined    Discharge Plan: referrals as indicated    Treatment Plan Created/Updated By: Brock Otto MD

## 2023-06-24 NOTE — CMS CERTIFICATION NOTE
Certification: Based upon physical, mental and social evaluations, I certify that inpatient psychiatric services are medically necessary for this patient for a duration of 12 midnights for the treatment of  Mood disorder

## 2023-06-24 NOTE — ASSESSMENT & PLAN NOTE
"· Per neuropsych's note in 05/2022:   · \"Neuropsychological Exam revealed diffuse cognitive dysfunction and on a measure assessing awareness of personal health status and ability to evaluate health problems, handle medical emergencies and take safety precautions, patient performed in the IMPAIRED range of functioning  At this time, patient does not appear to have capacity to make fully informed medical decisions  \"        "

## 2023-06-24 NOTE — ASSESSMENT & PLAN NOTE
• At this time, patient appears medically stable to continue inpatient psychiatric management  • Current labs, nursing notes and imaging reviewed    o Pending Labs: Vit B12, folate, & Vit D   • Recent EC2023 NSR; ECG today pending   o Will request SLIM to f/u tomorrow

## 2023-06-24 NOTE — ASSESSMENT & PLAN NOTE
· Management per primary team   · Presented to [facility] ED in police custody after argument w/ wife
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
## 2023-06-25 LAB — GLUCOSE SERPL-MCNC: 123 MG/DL (ref 65–140)

## 2023-06-25 PROCEDURE — 82948 REAGENT STRIP/BLOOD GLUCOSE: CPT

## 2023-06-25 PROCEDURE — 99232 SBSQ HOSP IP/OBS MODERATE 35: CPT

## 2023-06-25 RX ADMIN — CHOLECALCIFEROL TAB 25 MCG (1000 UNIT) 1000 UNITS: 25 TAB at 08:51

## 2023-06-25 RX ADMIN — CYANOCOBALAMIN TAB 1000 MCG 1000 MCG: 1000 TAB at 14:41

## 2023-06-25 RX ADMIN — TRAZODONE HYDROCHLORIDE 50 MG: 50 TABLET ORAL at 23:18

## 2023-06-25 RX ADMIN — ESCITALOPRAM OXALATE 10 MG: 10 TABLET ORAL at 08:51

## 2023-06-25 RX ADMIN — ARIPIPRAZOLE 5 MG: 5 TABLET ORAL at 08:51

## 2023-06-25 NOTE — PROGRESS NOTES
"Progress Note - [address] 46 y o  male MRN: [id number]  Unit/Bed#[place] Carlos 846-08 Encounter: [id number]      Behavior over the last 24 hours:  unchanged    Subjective: I saw Carlos \"Neel\" for follow up and continuation of care  I have reviewed the chart and discussed progress with the nursing staff  Patient is visible, withdrawn to self, paces the halls, and did not sleep at all last night  He is seen at the nurses station and phone frequently  He denies depression, anxiety, SI, HI, AVH  He is medication and meal compliant today but refused Abilify yesterday morning  He is not attending groups  He remains in good behavorial control  No PRNs in the last 24 hours  72 hour notice expires tomorrow 6/26 at 1809    On assessment, Ulises Mullins is seen ambulating the halls, appears anxious, disorganized and requests to speak with this provider  He is preoccupied with signing himself out wanting to leave today but was reminded of 72 hour notice expires tomorrow  He reports missing his family  He lacks insight in to his admission, \"I shouldn't be here\" and minimizes his symptoms  When reviewing staff report that he did not sleep at all, he denies feeling tired and declines HS medication offered to assist with sleep promotion \"I don't want to be on any medication\"  He denies anxiety though appears anxious, denies depression, SI, HI, plan, intent or self-injurious behaviors  Ulises Mullins appears paranoid, preoccupied but denies A/VH         Psychiatric Review of Systems:  Sleep: insomnia  Appetite: normal  Medication side effects: No   ROS: no complaints, all other systems are negative    Mental Status Evaluation:    Appearance:  age appropriate, casually dressed, dressed appropriately, no distress   Behavior:  cooperative, guarded, limited eye contact, restless and fidgety   Speech:  normal rate, normal volume, normal pitch, scant   Mood:  anxious   Affect:  mood-congruent   Thought Process:  coherent, perseverative " Thought Content:  mild paranoia, no overt delusions, preoccupied with discharge   Perceptual Disturbances: no auditory hallucinations, no visual hallucinations, appears preoccupied   Risk Potential: Suicidal ideation - None at present  Homicidal ideation - None  Potential for aggression - Not at present   Memory:  recent memory impaired, remote memory intact   Consciousness:  alert and awake   Attention: attention span and concentration are age appropriate   Insight:  poor   Judgment: limited   Gait/Station: normal gait/station   Motor Activity: no abnormal movements     Progress Toward Goals: no significant improvement today, still very anxious, still disorganized, slightly paranoid, poor insight  No significant changes in behaviors or clinical status over the last 24 hours  Discharge Disposition: TBD    Assessment/Plan   Principal Problem:    Mood disorder ([facility] [address] )  Active Problems:    Medical clearance for psychiatric admission    GERD (gastroesophageal reflux disease)    Cognitive dysfunction      Treatment Plan:  1  Continue with group therapy, individual therapy and goals for admission  2  Behavioral Health checks every 7 minutes for safety  3  Observe progress over weekend  4   No changes, continue current medications:      Current Facility-Administered Medications   Medication Dose Route Frequency Provider Last Rate   • acetaminophen  650 mg Oral Q4H PRN Kanika Rowan, CRNP     • acetaminophen  650 mg Oral Q4H PRN Minerva Rowan, CRNP     • acetaminophen  975 mg Oral Q6H PRN Minerva Rowan, CRNP     • ARIPiprazole  5 mg Oral Daily Konrad Longo MD     • cholecalciferol  1,000 Units Oral Daily Kanika Rowan, CRNP     • escitalopram  10 mg Oral Daily Kanika Rowan, CRNP     • haloperidol lactate  5 mg Intramuscular Q4H PRN Max 4/day Minerva Rowan, CRNP     • hydrOXYzine HCL  25 mg Oral Q6H PRN Max 4/day Kanika Rowan, CRNP     • hydrOXYzine HCL  50 mg Oral Q6H PRN Max 4/day Kanika Rowan, CRNP • LORazepam  1 mg Intramuscular Q6H PRN Max 3/day [place] Rowan, CRNP     • LORazepam  1 mg Oral Q6H PRN Max 3/day Kanika Rowan, CRNP     • risperiDONE  0 25 mg Oral Q4H PRN Max 6/day Kanika Rowan, CRNP     • risperiDONE  0 5 mg Oral Q4H PRN Max 3/day [place] Rowan, CRNP     • risperiDONE  1 mg Oral Q2H PRN Max 3/day [place] Rowan, CRNP     • traZODone  50 mg Oral HS PRN Kanika Rowan, CRNP         Vitals:  Vitals:    06/25/23 0722   BP: 135/77   Pulse: 83   Resp: 16   Temp: 97 6 °F (36 4 °C)   SpO2: 96%       Laboratory Results:    I have personally reviewed all pertinent laboratory/tests results    Labs in last 72 hours:   Recent Labs     06/24/23  0530   WBC 9 35   RBC 5 54   HGB 16 2   HCT 48 8      RDW 12 7   NEUTROABS 5 34   SODIUM 138   K 3 8      CO2 25   BUN 17   CREATININE 1 01   GLUC 114   GLUF 114*   CALCIUM 9 5   AST 18   ALT 28   ALKPHOS 97   TP 7 3   ALB 4 4   TBILI 1 35*   [id number] 1 727     Admission Labs:   Admission on 06/23/2023   Component Date Value   • Sodium 06/24/2023 138    • Potassium 06/24/2023 3 8    • Chloride 06/24/2023 103    • CO2 06/24/2023 25    • ANION GAP 06/24/2023 10    • BUN 06/24/2023 17    • Creatinine 06/24/2023 1 01    • Glucose 06/24/2023 114    • Glucose, Fasting 06/24/2023 114 (H)    • Calcium 06/24/2023 9 5    • AST 06/24/2023 18    • ALT 06/24/2023 28    • Alkaline Phosphatase 06/24/2023 97    • Total Protein 06/24/2023 7 3    • Albumin 06/24/2023 4 4    • Total Bilirubin 06/24/2023 1 35 (H)    • eGFR 06/24/2023 83    • Magnesium 06/24/2023 2 2    • WBC 06/24/2023 9 35    • RBC 06/24/2023 5 54    • Hemoglobin 06/24/2023 16 2    • Hematocrit 06/24/2023 48 8    • MCV 06/24/2023 88    • MCH 06/24/2023 29 2    • MCHC 06/24/2023 33 2    • RDW 06/24/2023 12 7    • MPV 06/24/2023 11 2    • Platelets 12/67/0709 237    • nRBC 06/24/2023 0    • Neutrophils Relative 06/24/2023 56    • Immat GRANS % 06/24/2023 0    • Lymphocytes Relative 06/24/2023 31    • Monocytes Relative 06/24/2023 10    • Eosinophils Relative 06/24/2023 2    • Basophils Relative 06/24/2023 1    • Neutrophils Absolute 06/24/2023 5 34    • Immature Grans Absolute 06/24/2023 0 03    • Lymphocytes Absolute 06/24/2023 2 88    • Monocytes Absolute 06/24/2023 0 90    • Eosinophils Absolute 06/24/2023 0 15    • Basophils Absolute 06/24/2023 0 05    • Folate 06/24/2023 >22 3    • TSH 3RD GENERATON 06/24/2023 1 727    • Vitamin B-12 06/24/2023 210    • Vit D, 25-Hydroxy 06/24/2023 23 1 (L)    • POC Glucose 06/25/2023 123          Risks / Benefits of Treatment:    Risks, benefits, and possible side effects of medications explained to patient  Patient has limited understanding of risks and benefits of treatment at this time, but agrees to take medications as prescribed  Counseling / Coordination of Care: Total floor / unit time spent today 30 minutes  Greater than 50% of total time was spent with the patient and / or family counseling and / or coordination of care  A description of the counseling / coordination of care:   Patient's presentation on admission and proposed treatment plan discussed with treatment team   Diagnosis, medication changes and treatment plan reviewed with patient  Importance of medication and treatment compliance reviewed with patient  Supportive therapy provided to patient  This note has been constructed using a voice recognition system  There may be translation, syntax, or grammatical errors  If you have any questions, please contact the dictating author    [place], [place]  06/25/23

## 2023-06-25 NOTE — NURSING NOTE
"Patient visible pacing on unit  Attempting to make multiple calls to family repetitively  Educated patient on appropriate use of phone; pt verbalized understanding  Compliant with meals and medications  No interaction with peers  Minimal interaction with staff and assessment  Denies all s/s at this time  States \"it's a mistake that I am here I am fine  \" Safety checks ongoing     "

## 2023-06-25 NOTE — PLAN OF CARE
Problem: Ineffective Coping  Goal: Cooperates with admission process  Description: Interventions:   - Complete admission process  Outcome: Progressing  Goal: Identifies ineffective coping skills  Outcome: Progressing  Goal: Identifies healthy coping skills  Outcome: Progressing  Goal: Demonstrates healthy coping skills  Outcome: Progressing  Goal: Patient/Family participate in treatment and DC plans  Description: Interventions:  - Provide therapeutic environment  Outcome: Progressing  Goal: Patient/Family verbalizes awareness of resources  Outcome: Progressing  Goal: Understands least restrictive measures  Description: Interventions:  - Utilize least restrictive behavior  Outcome: Progressing  Goal: Free from restraint events  Description: - Utilize least restrictive measures   - Provide behavioral interventions   - Redirect inappropriate behaviors   Outcome: Progressing     Problem: Risk for Self Injury/Neglect  Goal: Treatment Goal: Remain safe during length of stay, learn and adopt new coping skills, and be free of self-injurious ideation, impulses and acts at the time of discharge  Outcome: Progressing  Goal: Verbalize thoughts and feelings  Description: Interventions:  - Assess and re-assess patient's lethality and potential for self-injury  - Engage patient in 1:1 interactions, daily, for a minimum of 15 minutes  - Encourage patient to express feelings, fears, frustrations, hopes  - Establish rapport/trust with patient   Outcome: Progressing  Goal: Refrain from harming self  Description: Interventions:  - Monitor patient closely, per order  - Develop a trusting relationship  - Supervise medication ingestion, monitor effects and side effects   Outcome: Progressing  Goal: Recognize maladaptive responses and adopt new coping mechanisms  Outcome: Progressing  Goal: Complete daily ADLs, including personal hygiene independently, as able  Description: Interventions:  - Observe, teach, and assist patient with ADLS  - Monitor and promote a balance of rest/activity, with adequate nutrition and elimination  Outcome: Progressing     Problem: Depression  Goal: Treatment Goal: Demonstrate behavioral control of depressive symptoms, verbalize feelings of improved mood/affect, and adopt new coping skills prior to discharge  Outcome: Progressing  Goal: Verbalize thoughts and feelings  Description: Interventions:  - Assess and re-assess patient's level of risk   - Engage patient in 1:1 interactions, daily, for a minimum of 15 minutes   - Encourage patient to express feelings, fears, frustrations, hopes   Outcome: Progressing  Goal: Refrain from harming self  Description: Interventions:  - Monitor patient closely, per order   - Supervise medication ingestion, monitor effects and side effects   Outcome: Progressing  Goal: Refrain from isolation  Description: Interventions:  - Develop a trusting relationship   - Encourage socialization   Outcome: Progressing  Goal: Refrain from self-neglect  Outcome: Progressing  Goal: Complete daily ADLs, including personal hygiene independently, as able  Description: Interventions:  - Observe, teach, and assist patient with ADLS  -  Monitor and promote a balance of rest/activity, with adequate nutrition and elimination   Outcome: Progressing     Problem: Anxiety  Goal: Anxiety is at manageable level  Description: Interventions:  - Assess and monitor patient's anxiety level  - Monitor for signs and symptoms (heart palpitations, chest pain, shortness of breath, headaches, nausea, feeling jumpy, restlessness, irritable, apprehensive)  - Collaborate with interdisciplinary team and initiate plan and interventions as ordered    - [place] patient to unit/surroundings  - Explain treatment plan  - Encourage participation in care  - Encourage verbalization of concerns/fears  - Identify coping mechanisms  - Assist in developing anxiety-reducing skills  - Administer/offer alternative therapies  - Limit or eliminate stimulants  Outcome: Progressing

## 2023-06-26 PROCEDURE — 99232 SBSQ HOSP IP/OBS MODERATE 35: CPT | Performed by: STUDENT IN AN ORGANIZED HEALTH CARE EDUCATION/TRAINING PROGRAM

## 2023-06-26 RX ORDER — TRAZODONE HYDROCHLORIDE 50 MG/1
50 TABLET ORAL
Status: DISCONTINUED | OUTPATIENT
Start: 2023-06-26 | End: 2023-06-30 | Stop reason: HOSPADM

## 2023-06-26 RX ORDER — GABAPENTIN 300 MG/1
300 CAPSULE ORAL
Status: DISCONTINUED | OUTPATIENT
Start: 2023-06-26 | End: 2023-06-26

## 2023-06-26 RX ORDER — HYDROXYZINE 50 MG/1
50 TABLET, FILM COATED ORAL
Status: DISCONTINUED | OUTPATIENT
Start: 2023-06-26 | End: 2023-06-30 | Stop reason: HOSPADM

## 2023-06-26 RX ADMIN — CYANOCOBALAMIN TAB 1000 MCG 1000 MCG: 1000 TAB at 08:38

## 2023-06-26 RX ADMIN — CHOLECALCIFEROL TAB 25 MCG (1000 UNIT) 1000 UNITS: 25 TAB at 08:38

## 2023-06-26 RX ADMIN — ARIPIPRAZOLE 5 MG: 5 TABLET ORAL at 08:38

## 2023-06-26 RX ADMIN — ESCITALOPRAM OXALATE 10 MG: 10 TABLET ORAL at 08:38

## 2023-06-26 RX ADMIN — ACETAMINOPHEN 325 MG: 325 TABLET ORAL at 15:52

## 2023-06-26 RX ADMIN — TRAZODONE HYDROCHLORIDE 50 MG: 50 TABLET ORAL at 21:02

## 2023-06-26 NOTE — NURSING NOTE
"Pt is visible periodically on unit, withdrawn to self  Pt denies anxiety and depression  He reports no SI/HI or AH/VH  Pt reported 5/10 pain \"behind my eyes\"  Pt received PRN tylenol at 15:52  Pt later approached nurses asking \" Can I get the second pill? \" RN educated pt on prescribed medications and re-enforced that medications are to be taken as ordered  Medications effective reports that his pain is 1/10  Monitored  For safety and support    "
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
## 2023-06-26 NOTE — NURSING NOTE
Patient reported 5/10 pain behind both eyes and requested Tylenol and it was prepared  He took both pills in hand and then reports I only want to take one (325mg)  Education was provided on medication administration and dose given was 325mg @ [id number]

## 2023-06-26 NOTE — NURSING NOTE
Pt blunted with irritable edge but med-compliant with no agitation noted  VSS  Denied SI  Good appetite and steady gait  Monitored for safety and support
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
## 2023-06-26 NOTE — TREATMENT TEAM
06/26/23 1540   Team Meeting   Meeting Type Daily Rounds   Initial Conference Date 06/26/23   Team Members Present   Team Members Present Physician;Nurse;   Physician Team Member Dr Aram Romero Team Member [facility] Management Team Member Cornell Dukes   Patient/Family Present   Patient Present Yes   Patient's Family Present No     Treatment plan and goals reviewed with pt  Pt reports disagreement with goal of decrease paranoid thoughts; pt denies paranoia  Pt refused to sign treatment plan

## 2023-06-26 NOTE — PLAN OF CARE
Problem: Ineffective Coping  Goal: Cooperates with admission process  Description: Interventions:   - Complete admission process  Outcome: Progressing  Goal: Identifies ineffective coping skills  Outcome: Progressing  Goal: Identifies healthy coping skills  Outcome: Progressing  Goal: Demonstrates healthy coping skills  Outcome: Progressing  Goal: Patient/Family participate in treatment and DC plans  Description: Interventions:  - Provide therapeutic environment  Outcome: Progressing  Goal: Patient/Family verbalizes awareness of resources  Outcome: Progressing  Goal: Understands least restrictive measures  Description: Interventions:  - Utilize least restrictive behavior  Outcome: Progressing  Goal: Free from restraint events  Description: - Utilize least restrictive measures   - Provide behavioral interventions   - Redirect inappropriate behaviors   Outcome: Progressing     Problem: Risk for Self Injury/Neglect  Goal: Treatment Goal: Remain safe during length of stay, learn and adopt new coping skills, and be free of self-injurious ideation, impulses and acts at the time of discharge  Outcome: Progressing  Goal: Verbalize thoughts and feelings  Description: Interventions:  - Assess and re-assess patient's lethality and potential for self-injury  - Engage patient in 1:1 interactions, daily, for a minimum of 15 minutes  - Encourage patient to express feelings, fears, frustrations, hopes  - Establish rapport/trust with patient   Outcome: Progressing  Goal: Refrain from harming self  Description: Interventions:  - Monitor patient closely, per order  - Develop a trusting relationship  - Supervise medication ingestion, monitor effects and side effects   Outcome: Progressing  Goal: Recognize maladaptive responses and adopt new coping mechanisms  Outcome: Progressing  Goal: Complete daily ADLs, including personal hygiene independently, as able  Description: Interventions:  - Observe, teach, and assist patient with ADLS  - Monitor and promote a balance of rest/activity, with adequate nutrition and elimination  Outcome: Progressing     Problem: Depression  Goal: Treatment Goal: Demonstrate behavioral control of depressive symptoms, verbalize feelings of improved mood/affect, and adopt new coping skills prior to discharge  Outcome: Progressing  Goal: Verbalize thoughts and feelings  Description: Interventions:  - Assess and re-assess patient's level of risk   - Engage patient in 1:1 interactions, daily, for a minimum of 15 minutes   - Encourage patient to express feelings, fears, frustrations, hopes   Outcome: Progressing  Goal: Refrain from harming self  Description: Interventions:  - Monitor patient closely, per order   - Supervise medication ingestion, monitor effects and side effects   Outcome: Progressing  Goal: Refrain from isolation  Description: Interventions:  - Develop a trusting relationship   - Encourage socialization   Outcome: Progressing  Goal: Refrain from self-neglect  Outcome: Progressing  Goal: Complete daily ADLs, including personal hygiene independently, as able  Description: Interventions:  - Observe, teach, and assist patient with ADLS  -  Monitor and promote a balance of rest/activity, with adequate nutrition and elimination   Outcome: Progressing     Problem: Anxiety  Goal: Anxiety is at manageable level  Description: Interventions:  - Assess and monitor patient's anxiety level  - Monitor for signs and symptoms (heart palpitations, chest pain, shortness of breath, headaches, nausea, feeling jumpy, restlessness, irritable, apprehensive)  - Collaborate with interdisciplinary team and initiate plan and interventions as ordered    - Parmelee patient to unit/surroundings  - Explain treatment plan  - Encourage participation in care  - Encourage verbalization of concerns/fears  - Identify coping mechanisms  - Assist in developing anxiety-reducing skills  - Administer/offer alternative therapies  - Limit or eliminate stimulants  Outcome: Progressing

## 2023-06-26 NOTE — TREATMENT TEAM
Pt attended 1 am group and left early in the afternoon  Pt bizarre and made a paper airplane out of the Crisis handout 988 provided  Notified Nursing of pt behaviors  06/26/23 1000   Activity/Group Checklist   Group Other (Comment)  ( Open discussion: self care action plan)   Attendance Attended   Attendance Duration (min) 31-45   Interactions Interacted appropriately   Affect/Mood Blunted/flat   Goals Achieved Identified feelings; Identified relapse prevention strategies; Able to listen to others; Able to engage in interactions; Increased hopefulness

## 2023-06-27 PROCEDURE — 99232 SBSQ HOSP IP/OBS MODERATE 35: CPT | Performed by: STUDENT IN AN ORGANIZED HEALTH CARE EDUCATION/TRAINING PROGRAM

## 2023-06-27 RX ORDER — ARIPIPRAZOLE 10 MG/1
10 TABLET ORAL DAILY
Status: DISCONTINUED | OUTPATIENT
Start: 2023-06-28 | End: 2023-06-28

## 2023-06-27 RX ADMIN — ARIPIPRAZOLE 5 MG: 5 TABLET ORAL at 09:06

## 2023-06-27 RX ADMIN — TRAZODONE HYDROCHLORIDE 50 MG: 50 TABLET ORAL at 21:23

## 2023-06-27 RX ADMIN — ESCITALOPRAM OXALATE 10 MG: 10 TABLET ORAL at 09:06

## 2023-06-27 RX ADMIN — CHOLECALCIFEROL TAB 25 MCG (1000 UNIT) 1000 UNITS: 25 TAB at 09:06

## 2023-06-27 RX ADMIN — ACETAMINOPHEN 650 MG: 325 TABLET ORAL at 09:51

## 2023-06-27 RX ADMIN — CYANOCOBALAMIN TAB 1000 MCG 1000 MCG: 1000 TAB at 09:06

## 2023-06-27 NOTE — PLAN OF CARE
Problem: Ineffective Coping  Goal: Identifies ineffective coping skills  Outcome: Progressing  Goal: Identifies healthy coping skills  Outcome: Progressing  Goal: Demonstrates healthy coping skills  Outcome: Progressing  Goal: Understands least restrictive measures  Description: Interventions:  - Utilize least restrictive behavior  Outcome: Progressing  Goal: Free from restraint events  Description: - Utilize least restrictive measures   - Provide behavioral interventions   - Redirect inappropriate behaviors   Outcome: Progressing     Problem: Risk for Self Injury/Neglect  Goal: Treatment Goal: Remain safe during length of stay, learn and adopt new coping skills, and be free of self-injurious ideation, impulses and acts at the time of discharge  Outcome: Progressing  Goal: Verbalize thoughts and feelings  Description: Interventions:  - Assess and re-assess patient's lethality and potential for self-injury  - Engage patient in 1:1 interactions, daily, for a minimum of 15 minutes  - Encourage patient to express feelings, fears, frustrations, hopes  - Establish rapport/trust with patient   Outcome: Progressing  Goal: Refrain from harming self  Description: Interventions:  - Monitor patient closely, per order  - Develop a trusting relationship  - Supervise medication ingestion, monitor effects and side effects   Outcome: Progressing  Goal: Recognize maladaptive responses and adopt new coping mechanisms  Outcome: Progressing  Goal: Complete daily ADLs, including personal hygiene independently, as able  Description: Interventions:  - Observe, teach, and assist patient with ADLS  - Monitor and promote a balance of rest/activity, with adequate nutrition and elimination  Outcome: Progressing     Problem: Depression  Goal: Treatment Goal: Demonstrate behavioral control of depressive symptoms, verbalize feelings of improved mood/affect, and adopt new coping skills prior to discharge  Outcome: Progressing  Goal: Verbalize thoughts and feelings  Description: Interventions:  - Assess and re-assess patient's level of risk   - Engage patient in 1:1 interactions, daily, for a minimum of 15 minutes   - Encourage patient to express feelings, fears, frustrations, hopes   Outcome: Progressing  Goal: Refrain from harming self  Description: Interventions:  - Monitor patient closely, per order   - Supervise medication ingestion, monitor effects and side effects   Outcome: Progressing  Goal: Refrain from isolation  Description: Interventions:  - Develop a trusting relationship   - Encourage socialization   Outcome: Progressing  Goal: Refrain from self-neglect  Outcome: Progressing  Goal: Complete daily ADLs, including personal hygiene independently, as able  Description: Interventions:  - Observe, teach, and assist patient with ADLS  -  Monitor and promote a balance of rest/activity, with adequate nutrition and elimination   Outcome: Progressing     Problem: Anxiety  Goal: Anxiety is at manageable level  Description: Interventions:  - Assess and monitor patient's anxiety level  - Monitor for signs and symptoms (heart palpitations, chest pain, shortness of breath, headaches, nausea, feeling jumpy, restlessness, irritable, apprehensive)  - Collaborate with interdisciplinary team and initiate plan and interventions as ordered    - Erin patient to unit/surroundings  - Explain treatment plan  - Encourage participation in care  - Encourage verbalization of concerns/fears  - Identify coping mechanisms  - Assist in developing anxiety-reducing skills  - Administer/offer alternative therapies  - Limit or eliminate stimulants  Outcome: Progressing

## 2023-06-27 NOTE — TREATMENT TEAM
06/27/23 0951   Charting Type   Charting Type Shift assessment   Pain Assessment   Pain Assessment Tool 0-10   Pain Score 4   Pain Location/Orientation Orientation: Upper; Location: Back   Pain Onset/Description Onset: Sudden   Patient's Stated Pain Goal No pain   Hospital Pain Intervention(s) Medication (See MAR)   Multiple Pain Sites No     Patient approached this writer and requested pain medication for an ingrown hair on his mid-upper back  Tylenol 650 MG PO offered and accepted  Will reassess

## 2023-06-27 NOTE — TREATMENT TEAM
06/27/23 0829   Team Meeting   Meeting Type Daily Rounds   Initial Conference Date 06/27/23   Team Members Present   Team Members Present Physician;Nurse;;; Occupational Therapist   Physician Team Member Dr Jesse Romero Team Member Radha Phillips Management Team Member Stef[address] Work Team Member Maru   OT Team Member Chavo CHAVEZ   Patient/Family Present   Patient Present No   Patient's Family Present No     Slept, irritable edge, pacing, no agitation, rescinded 72 hr notice; making paper airplanes with handouts  Refusing to sign WILLIAM for family  Plan for med increase

## 2023-06-27 NOTE — TREATMENT TEAM
Pt guarded and would not engage in discussion when prompted  Poor eye contact  06/27/23 1330   Activity/Group Checklist   Group Other (Comment)  (positive reflection and coping skills group)   Attendance Attended   Attendance Duration (min) 31-45   Interactions Other (Comment)  (scant and declined to enagage)   Affect/Mood Constricted   Goals Achieved Identified feelings; Discussed coping strategies; Able to listen to others

## 2023-06-27 NOTE — NURSING NOTE
Patient affect is brighter this evening  Patient is visible on the unit more often this evening  Patient was socializing with another peer

## 2023-06-27 NOTE — PROGRESS NOTES
"  Progress Note - [address] 46 y o  male MRN: [id number]  Unit/Bed#Tisha Johnson [id number] Encounter: [id number]       Patient was visited on unit for continuing care; chart reviewed and discussed with multidisciplinary treatment team  On approach, the patient was calm, guarded and superficially cooperative  He was minimizing his sxs, stated that he is not depressed and while was asked about hearing voices as reported by the family, he stated: \"it's just telepathy\", but did not elaborate further details  He initially refused to take his meds, stated: \"I collapsed after I took Lexapro\", but was not able to provide any details, and later agreed to take the medication  He reported paranoid ideations about \"being hurt\" but did not provide details  Denied any changes in mood, appetite, and energy level  No problem initiating and maintaining sleep since started on Trazodone  Denied A/VH currently  Denied SI/HI, intent or plan upon direct inquiry at this time  Patient continues to be intermittently visible in the milieu and interacts with select staff and peers  However, remains mostly withdrawn to self and pacing on the unit at times  No reports of aggression or self-injurious behavior on unit  No PRN medications used in the past 24 hours except Tylenol for pain  Patient accepted all offered medications and reported feeling \"good\"  No adverse effects of medications noted or reported  Abilify was increased to 10 mg daily; doses to be adjusted as indicated      Current Mental Status Evaluation:  Appearance and attitude: appeared as stated age, casually dressed, with good hygiene  Eye contact: fair  Motor Function: within normal limits, intact gait, No PMA/PMR  Gait/station: normal gait/station and normal balance  Speech: normal for rate, rhythm, volume, with increased latency and decreased amount  Language: No overt abnormality  Mood/affect: dysphoric / Affect was constricted  Thought Processes: " tangential, perseverative, preoccupied with discharge  Thought content: denied suicidal ideations or homicidal ideations, persecutory delusions, paranoid ideation, preoccupied with discharge  Associations: concrete associations  Perceptual disturbances: no visual hallucinations, vague auditory hallucinations, appears preoccupied  Orientation: oriented to time, person, place and to the situational context  Cognitive Function: intact  Memory: recent and remote memory grossly intact  Intellect: unable to assess  Fund of knowledge: aware of current events, aware of past history and vocabulary average  Impulse control: good  Insight/judgment: limited/limited    Vital signs in last 24 hours:    Temp:  [97 8 °F (36 6 °C)-98 2 °F (36 8 °C)] 98 °F (36 7 °C)  HR:  [79-88] 86  Resp:  [16-17] 17  BP: (114-140)/(76-85) 114/76    Laboratory results: I have personally reviewed all pertinent laboratory/tests results    Results from the past 24 hours: No results found for this or any previous visit (from the past 24 hour(s))  Progress Toward Goals: limited improvement    Assessment:  Principal Problem:    Mood disorder (HCC)  Active Problems:    Medical clearance for psychiatric admission    GERD (gastroesophageal reflux disease)    Cognitive dysfunction        Plan:  - f/u SLIM recs regarding the medical problems  - Continue medication titration and treatment plan; adjust medication to optimize treatment response and as clinically indicated       Scheduled medications:  Current Facility-Administered Medications   Medication Dose Route Frequency Provider Last Rate   • acetaminophen  650 mg Oral Q4H PRN RUIZ MariaNP     • acetaminophen  650 mg Oral Q4H PRN RUIZ MariaNP     • acetaminophen  975 mg Oral Q6H PRN CHELITA Maria     • [START ON 6/28/2023] ARIPiprazole  10 mg Oral Daily Kaylie Alarcon MD     • cholecalciferol  1,000 Units Oral Daily CHELITA Maria     • cyanocobalamin  1,000 mcg Oral Daily Jamie Chua PA-C     • escitalopram  10 mg Oral Daily Noreen Asya, CRNP     • haloperidol lactate  5 mg Intramuscular Q4H PRN Max 4/day Noreen Asya, CRNP     • hydrOXYzine HCL  25 mg Oral Q6H PRN Max 4/day Noreen Asya, CRNP     • hydrOXYzine HCL  50 mg Oral Q6H PRN Max 4/day Noreen Asya, CRNP     • hydrOXYzine HCL  50 mg Oral HS PRN Sera Nunez MD     • LORazepam  1 mg Intramuscular Q6H PRN Max 3/day Noreen Asya, CRNP     • LORazepam  1 mg Oral Q6H PRN Max 3/day Noreen Asya, CRNP     • risperiDONE  0 25 mg Oral Q4H PRN Max 6/day Noreen Asya, CRNP     • risperiDONE  0 5 mg Oral Q4H PRN Max 3/day Noreen Asya, CRNP     • risperiDONE  1 mg Oral Q2H PRN Max 3/day Noreen Asya, CRNP     • traZODone  50 mg Oral HS Sera Nunez MD          PRN:  •  acetaminophen  •  acetaminophen  •  acetaminophen  •  haloperidol lactate  •  hydrOXYzine HCL  •  hydrOXYzine HCL  •  hydrOXYzine HCL  •  LORazepam  •  LORazepam  •  risperiDONE  •  risperiDONE  •  risperiDONE    - Observation: routine    - VS: as per unit protocol  - Diet: Regular diet  - Psychoeducation (benefits and potential risks) discussed, importance of compliance with the psychiatric treatment reiterated, and the patient verbalized understanding of the matter  - Encourage group attendance and milieu therapy    - The pt was educated and agreed to verbalize any suicidal thoughts, frustrations or concerns to the nursing staff, immediately  - Dispo: TBD       Next of Kin  · Extended Emergency Contact Information  · Primary Emergency Contact: Mariana Nam  · Mobile Phone: [phone]  · Relation: Daughter  · Secondary Emergency Contact: Yuriy Fall  · Mobile Phone: [phone]  · Relation: Spouse      Counseling / Coordination of Care  Patient's progress discussed with staff in treatment team meeting  Medications, treatment progress and treatment plan reviewed with patient    Medication changes discussed with patient  Educated on importance of medication and treatment compliance  Supportive therapy provided to patient  Cognitive techniques utilized during the session  Reassurance and supportive therapy provided  Reoriented to reality and reassured  Encouraged participation in milieu and group therapy on the unit       Maged Rajan MD  Attending [address]

## 2023-06-27 NOTE — NURSING NOTE
Patient is minimally visible on the unit, usually wandering or pacing  Patient has a flat affect  Patient is guarded and suspicious  Vague in conversation  Patient did not want to take his AM medications, stating that the meds make him feel dizzy and tired  After speaking with psychiatrist, patient was agreeable to taking medications  Patient denies all symptoms, SI/HI, AH/VH and anxiety  Patient endorses depression and states he is depressed because he is here  Appetite is good   Sleeping well (per report )

## 2023-06-28 PROCEDURE — 99232 SBSQ HOSP IP/OBS MODERATE 35: CPT | Performed by: STUDENT IN AN ORGANIZED HEALTH CARE EDUCATION/TRAINING PROGRAM

## 2023-06-28 RX ORDER — ESCITALOPRAM OXALATE 10 MG/1
10 TABLET ORAL
Status: DISCONTINUED | OUTPATIENT
Start: 2023-06-28 | End: 2023-06-30 | Stop reason: HOSPADM

## 2023-06-28 RX ORDER — ARIPIPRAZOLE 10 MG/1
10 TABLET ORAL
Status: DISCONTINUED | OUTPATIENT
Start: 2023-06-28 | End: 2023-06-30 | Stop reason: HOSPADM

## 2023-06-28 RX ADMIN — CYANOCOBALAMIN TAB 1000 MCG 1000 MCG: 1000 TAB at 08:19

## 2023-06-28 RX ADMIN — ESCITALOPRAM OXALATE 10 MG: 10 TABLET ORAL at 08:19

## 2023-06-28 RX ADMIN — CHOLECALCIFEROL TAB 25 MCG (1000 UNIT) 1000 UNITS: 25 TAB at 08:19

## 2023-06-28 RX ADMIN — ESCITALOPRAM OXALATE 10 MG: 10 TABLET ORAL at 21:23

## 2023-06-28 RX ADMIN — ARIPIPRAZOLE 10 MG: 10 TABLET ORAL at 21:23

## 2023-06-28 RX ADMIN — ARIPIPRAZOLE 10 MG: 10 TABLET ORAL at 08:19

## 2023-06-28 RX ADMIN — TRAZODONE HYDROCHLORIDE 50 MG: 50 TABLET ORAL at 21:23

## 2023-06-28 NOTE — TREATMENT TEAM
06/28/23 0832   Team Meeting   Meeting Type Daily Rounds   Initial Conference Date 06/28/23   Team Members Present   Team Members Present Physician;Nurse;;; Occupational Therapist;Other (Discipline and Name)   Physician Team Member Dr Jd Lowry Team Member [facility] Management Team Member Stefani Garza Work Team Member Maru   OT Team Member George Randall   Other (Discipline and Name) Pharmacist: Giuliano Neff   Patient/Family Present   Patient Present No   Patient's Family Present No     Resistive to HS meds, paranoid, withdrawn, slept, abilify was increased, minimized paranoid delusions, denies events prior to admission as being outside of normal

## 2023-06-28 NOTE — TREATMENT TEAM
"   06/28/23 1011   Vital Signs   Temperature 98 2 °F (36 8 °C)   Temp Source Temporal   Pulse 100   Heart Rate Source Monitor   Blood Pressure 119/72   BP Location Right arm   BP Method Automatic   Patient Position - Orthostatic VS Sitting   Oxygen Therapy   SpO2 97 %   SpO2 Activity At Rest   O2 Device None (Room air)     Pt c/o of \"dizziness\" Vitals taken and are stable  Fluids offered and declined  Pt stated medications are not working they make him feel dizzy after taking and will not be taking them anymore  Support provided     "

## 2023-06-28 NOTE — PROGRESS NOTES
06/28/23 1100   Activity/Group Checklist   Group Exercise  (seated musical exercises)   Attendance Attended   Attendance Duration (min) 46-60   Interactions Interacted appropriately  (Pt  quiet yet social with peers if they initiated conversation )   Affect/Mood Appropriate   Goals Achieved Able to engage in interactions; Able to listen to others

## 2023-06-28 NOTE — CASE MANAGEMENT
ANURAG spoke with the patient's daughter, Kelsie Yusuf, at [phone]  Kelsie Yusuf reported that the patient's mental health decline began last year when he went missing for 24 hours  She reported that patient refused psychiatric help until this year, but has been inconsistent with following up  Kelsie Yusuf reported her father sees Dr Sara Martinez at [facility] in [facility] ([phone]) and has an appointment in August  She reported she will call the office today in order to try to make an outpatient  Kelsie Yusuf reported she doesn't believe Lexapro was working for the patient  Shelby asked if the patient stabilized and was participating in treatment, CM provided Kelsie Yusuf with updated information  Kelsie Yusuf reported her and her mother are willing to accept the patient back home  ANURAG reported the patient will likely be ready to go home Friday 6/30/23  Kelsie Yusuf reported her mother will be the one picking him up Friday and that 1:00pm was the best time  Kelsie Yusuf requested that the patient's medications be scheduled for the evening, as her mother supervises the patient when taking medications  Kelsie Yusuf declined having any safety concerns for herself or her mother re: the patient returning home  She reported they are aware of what to do in the event that the patient is violent/aggressive and/or has a mental health emergency  ANURAG sent a secure message to Dr Daniel Silva who agreed to change medication timing to the evenings

## 2023-06-28 NOTE — PROGRESS NOTES
"  Progress Note - [address] 46 y o  male MRN: [id number]  Unit/Bed#Amanda Castillo 162- Encounter: [id number]       Patient was visited on unit for continuing care; chart reviewed and discussed with multidisciplinary treatment team  On approach, the patient was calm, guarded, and preoccupied with being discharged on Friday  He was also preoccupied with his meds, belives that the meds are sedating \"right after taking them\" and feels that he would \"faint\" and \"should stay in bed\", but then gets better  Psycho-education regarding indications, benefits, risks, side effects, and alternative options provided to the patient, and the importance of the compliance with psychiatric treatment reiterated  The patient was educated that the medication requires some time to get absorbed, and if he feels sedated or fainting right after taking the meds, it seems to be more psychological and related to his opposition to being on meds than the effects of medications  The patient verbalized understanding and agreed to the proposed regimen  He denied any depressive sxs  Denied any changes in appetite, and energy level  No problem initiating and maintaining sleep  Denied A/VH currently and while was asked about any episodes of \"telepathy\", he stated: \"not anymore\", but does not elaborate further details or prior episodes, as well  Denied SI/HI, intent or plan upon direct inquiry at this time  Patient continues to be intermittently visible in the milieu and interacts with select staff and peers  No reports of aggression or self-injurious behavior on unit  No PRN medications used in the past 24 hours except Tylenol  Patient accepted all offered medications and reported feeling \"good\"  No adverse effects of medications noted or reported  Abilify was increased to 10 mg daily and timing of meds is being switched to nightly to improve adherence   Tentative discharge on Friday upon further stabilization, and the family " confirmed to  the patient and to return home  Current Mental Status Evaluation:  Appearance and attitude: appeared as stated age, casually dressed, with good hygiene  Eye contact: good  Motor Function: within normal limits, intact gait, No PMA/PMR  Gait/station: normal gait/station and normal balance  Speech: normal for rate, rhythm, volume, and latency with decreased amount  Language: No overt abnormality  Mood/affect: dysphoric / Affect was constricted but reactive, mood congruent  Thought Processes: illogical, perseverative  Thought content: denied suicidal ideations or homicidal ideations, paranoid ideation, ruminating thoughts  Associations: concrete associations, perseverative  Perceptual disturbances: denies Auditory/Visual/Tactile Hallucinations  Orientation: oriented to time, person, place and to the situational context  Cognitive Function: intact  Memory: recent and remote memory grossly intact  Intellect: unable to assess  Fund of knowledge: aware of current events, aware of past history and vocabulary average  Impulse control: good  Insight/judgment: limited/limited    Vital signs in last 24 hours:    Temp:  [97 8 °F (36 6 °C)-98 4 °F (36 9 °C)] 98 2 °F (36 8 °C)  HR:  [] 100  Resp:  [16-18] 18  BP: (117-136)/(72-81) 119/72    Laboratory results: I have personally reviewed all pertinent laboratory/tests results    Results from the past 24 hours: No results found for this or any previous visit (from the past 24 hour(s))  Progress Toward Goals: gradual improvement    Assessment:  Principal Problem:    Mood disorder (HCC)  Active Problems:    Medical clearance for psychiatric admission    GERD (gastroesophageal reflux disease)    Cognitive dysfunction        Plan:  - f/u SLIM recs regarding the medical problems  - Continue medication titration and treatment plan; adjust medication to optimize treatment response and as clinically indicated       Scheduled medications:  Current Facility-Administered Medications   Medication Dose Route Frequency Provider Last Rate   • acetaminophen  650 mg Oral Q4H PRN Katheleen Edwin, CRNP     • acetaminophen  650 mg Oral Q4H PRN Katheleen Edwin, CRNP     • acetaminophen  975 mg Oral Q6H PRN Katheleen Edwin, CRNP     • ARIPiprazole  10 mg Oral Daily Amanda Flores MD     • cholecalciferol  1,000 Units Oral Daily Katheleen Edwin, CRNP     • cyanocobalamin  1,000 mcg Oral Daily Joel Carr PA-C     • escitalopram  10 mg Oral Daily Katheleen Edwin, CRNP     • haloperidol lactate  5 mg Intramuscular Q4H PRN Max 4/day Katheleen Edwin, CRNP     • hydrOXYzine HCL  25 mg Oral Q6H PRN Max 4/day Katheleen Edwin, CRNP     • hydrOXYzine HCL  50 mg Oral Q6H PRN Max 4/day Katheleen Edwin, CRNP     • hydrOXYzine HCL  50 mg Oral HS PRN Amanda Flores MD     • LORazepam  1 mg Intramuscular Q6H PRN Max 3/day Katheleen Edwin, CRNP     • LORazepam  1 mg Oral Q6H PRN Max 3/day Katheleen Edwin, CRNP     • risperiDONE  0 25 mg Oral Q4H PRN Max 6/day Katheleen Edwin, CRNP     • risperiDONE  0 5 mg Oral Q4H PRN Max 3/day Katheleen Edwin, CRNP     • risperiDONE  1 mg Oral Q2H PRN Max 3/day Katheleen Edwin, CRNP     • traZODone  50 mg Oral HS Amanda Flores MD          PRN:  •  acetaminophen  •  acetaminophen  •  acetaminophen  •  haloperidol lactate  •  hydrOXYzine HCL  •  hydrOXYzine HCL  •  hydrOXYzine HCL  •  LORazepam  •  LORazepam  •  risperiDONE  •  risperiDONE  •  risperiDONE    - Observation: routine    - VS: as per unit protocol  - Diet: Regular diet  - Psychoeducation (benefits and potential risks) discussed, importance of compliance with the psychiatric treatment reiterated, and the patient verbalized understanding of the matter  - Encourage group attendance and milieu therapy    - The pt was educated and agreed to verbalize any suicidal thoughts, frustrations or concerns to the nursing staff, immediately      - Dispo: TBD       Next of Kin  · Extended Emergency Contact Information  · Primary Emergency Contact: Ermias Wilkes  · Mobile Phone: [phone]  · Relation: Daughter  · Secondary Emergency Contact: Mirta Lewis  · Mobile Phone: [phone]  · Relation: Spouse      Counseling / Coordination of Care  Patient's progress discussed with staff in treatment team meeting  Medications, treatment progress and treatment plan reviewed with patient  Medication changes discussed with patient  Coping skills reviewed with patient  Educated on importance of medication and treatment compliance  Discussed with patient acceptance of mental illness diagnosis and need for ongoing treatment after discharge  Supportive therapy provided to patient  Cognitive techniques utilized during the session  Reoriented to reality and reassured  Group attendance encouraged       Bobie Duverney, MD  Attending [id number]

## 2023-06-28 NOTE — NURSING NOTE
Patient cooperative with care, medications taken, social with select peers, visible in milieu  Denies AH/VH, HI/SI  Denies anxiety/depression  Safety precautions maintained

## 2023-06-28 NOTE — CASE MANAGEMENT
Cm met with pt; reviewed WILLIAM for dtr and psychiatrist  Pt in agreement and signed release for both  Pt remains focused on d/c date

## 2023-06-28 NOTE — NURSING NOTE
"This writer reassessed patient regarding episode of feeling dizzy, patient stated \"it the medication doing it to me, soon as I took it, it knocked me right out\" Patient educated on medications taking time to work, patient was not receptive to education    "

## 2023-06-29 PROCEDURE — 99232 SBSQ HOSP IP/OBS MODERATE 35: CPT | Performed by: STUDENT IN AN ORGANIZED HEALTH CARE EDUCATION/TRAINING PROGRAM

## 2023-06-29 RX ORDER — MELATONIN
1000 DAILY
Qty: 30 TABLET | Refills: 1 | Status: SHIPPED | OUTPATIENT
Start: 2023-06-30 | End: 2023-08-29

## 2023-06-29 RX ORDER — TRAZODONE HYDROCHLORIDE 50 MG/1
50 TABLET ORAL
Qty: 30 TABLET | Refills: 1 | Status: SHIPPED | OUTPATIENT
Start: 2023-06-29 | End: 2023-08-28

## 2023-06-29 RX ORDER — ESCITALOPRAM OXALATE 10 MG/1
10 TABLET ORAL
Qty: 30 TABLET | Refills: 1 | Status: SHIPPED | OUTPATIENT
Start: 2023-06-29 | End: 2023-08-28

## 2023-06-29 RX ORDER — ARIPIPRAZOLE 10 MG/1
10 TABLET ORAL
Qty: 30 TABLET | Refills: 1 | Status: SHIPPED | OUTPATIENT
Start: 2023-06-29 | End: 2023-08-28

## 2023-06-29 RX ADMIN — TRAZODONE HYDROCHLORIDE 50 MG: 50 TABLET ORAL at 21:36

## 2023-06-29 RX ADMIN — CHOLECALCIFEROL TAB 25 MCG (1000 UNIT) 1000 UNITS: 25 TAB at 08:05

## 2023-06-29 RX ADMIN — ARIPIPRAZOLE 10 MG: 10 TABLET ORAL at 21:36

## 2023-06-29 RX ADMIN — CYANOCOBALAMIN TAB 1000 MCG 1000 MCG: 1000 TAB at 08:05

## 2023-06-29 RX ADMIN — ESCITALOPRAM OXALATE 10 MG: 10 TABLET ORAL at 21:36

## 2023-06-29 NOTE — CASE MANAGEMENT
CM spoke with the patient's daughter, Linda Womack, who provided CM with the patient's PCP information for follow up  Linda Foxs reported she was hoping that the patient could be d/c on a different medication than Lexapro as her and her mother feel that medication has not been working in the past  CM will discuss with the treatment team in rounds

## 2023-06-29 NOTE — DISCHARGE INSTR - APPOINTMENTS
Erasmo Lopez or Marleny, our Harman and Indira, will be calling you after your discharge, on the phone number that you provided  They will be available as an additional support, if needed  If you wish to speak with one of them, you may contact Saloni Lewis at [phone] or Sandy Latham at [phone]

## 2023-06-29 NOTE — PLAN OF CARE
Pt attends group and visible       Problem: Depression  Goal: Attend and participate in unit activities, including therapeutic, recreational, and educational groups  Description: Interventions:  - Provide therapeutic and educational activities daily, encourage attendance and participation, and document same in the medical record   Outcome: Progressing

## 2023-06-29 NOTE — PROGRESS NOTES
"Met with pt to complete  relapse prevention plan  Pt guarded, scant and minimzes symptoms  Pt noted signs and symptoms upon admission as : \"anxiety\"  Pt denied depression or suicidal   Crisis and warmline phone numbers provided  Pt signed and copy in chart  Pt attends groups  06/29/23 0954   Activity/Group Checklist   Group Admission/Discharge   Attendance Attended   Attendance Duration (min) 16-30   Interactions Other (Comment)  (guarded)   Affect/Mood Constricted   Goals Achieved Identified feelings; Identified relapse prevention strategies; Identified resources and support systems; Able to engage in interactions; Able to listen to others; Able to reflect/comment on own behavior;Able to manage/cope with feelings; Able to self-disclose       "

## 2023-06-29 NOTE — PROGRESS NOTES
"  Progress Note - [address] 46 y o  male MRN: [id number]  Unit/Bed#Tisha Johnson 742-40 Encounter: [id number]       Patient was visited on unit for continuing care; chart reviewed and discussed with multidisciplinary treatment team  On approach, the patient was calm and cooperative  Reported feeling better since his meds were switched to night time  Denied any changes in appetite, and energy level  No problem initiating and maintaining sleep  Denied A/VH currently  Denied SI/HI, intent or plan upon direct inquiry at this time  He appeared future oriented and looks forward to getting back home and being with his family  Patient has been more visible on the milieu and interacts with select staff and peers  No reports of aggression or self-injurious behavior on unit  No PRN medications used in the past 24 hours  Patient accepted all offered medications and reported feeling better  No adverse effects of medications noted or reported  Tentative discharge tomorrow          Current Mental Status Evaluation:  Appearance and attitude: appeared as stated age, casually dressed, bearded, with good hygiene  Eye contact: good  Motor Function: within normal limits, intact gait, No PMA/PMR  Gait/station: normal gait/station and normal balance  Speech: normal for rate, rhythm, volume, and latency with decreased amount  Language: No overt abnormality  Mood/affect: \"better\" / Affect was constricted but reactive, mood congruent  Thought Processes: sequential and goal-directed  Thought content: denies suicidal ideation or homicidal ideation; no delusions or first rank symptoms  Associations: concrete associations  Perceptual disturbances: denies Auditory/Visual/Tactile Hallucinations  Orientation: oriented to time, person, place and to the situational context  Cognitive Function: intact  Memory: recent and remote memory grossly intact  Intellect: average  Fund of knowledge: aware of current events, aware of " past history and vocabulary average  Impulse control: good  Insight/judgment: fair/fair    Pain: denied  Pain scale: 0      Vital signs in last 24 hours:    Temp:  [97 2 °F (36 2 °C)-97 8 °F (36 6 °C)] 97 4 °F (36 3 °C)  HR:  [81-82] 81  Resp:  [16-17] 16  BP: (110-119)/(68-74) 110/68    Laboratory results: I have personally reviewed all pertinent laboratory/tests results    Results from the past 24 hours: No results found for this or any previous visit (from the past 24 hour(s))  Progress Toward Goals: improving    Assessment:  Principal Problem:    Mood disorder (HCC)  Active Problems:    Medical clearance for psychiatric admission    GERD (gastroesophageal reflux disease)    Cognitive dysfunction        Plan:  - f/u [facility] recs regarding the medical problems  - Continue medication titration and treatment plan; adjust medication to optimize treatment response and as clinically indicated       Scheduled medications:  Current Facility-Administered Medications   Medication Dose Route Frequency Provider Last Rate   • acetaminophen  650 mg Oral Q4H PRN Fatmata Cable, CRNP     • acetaminophen  650 mg Oral Q4H PRN Fatmata Cable, CRNP     • acetaminophen  975 mg Oral Q6H PRN Fatmata Cable, CRNP     • ARIPiprazole  10 mg Oral HS Ival Free, MD     • cholecalciferol  1,000 Units Oral Daily Fatmata Cable, CRNP     • cyanocobalamin  1,000 mcg Oral Daily Cheyanne Shannon PA-C     • escitalopram  10 mg Oral HS Ival Free, MD     • haloperidol lactate  5 mg Intramuscular Q4H PRN Max 4/day Fatmata Cable, CRNP     • hydrOXYzine HCL  25 mg Oral Q6H PRN Max 4/day Fatmata Cable, CRNP     • hydrOXYzine HCL  50 mg Oral Q6H PRN Max 4/day Fatmata Cable, CRNP     • hydrOXYzine HCL  50 mg Oral HS PRN Ival Free, MD     • LORazepam  1 mg Intramuscular Q6H PRN Max 3/day Fatmata Cable, CRNP     • LORazepam  1 mg Oral Q6H PRN Max 3/day Fatmata Cable, CRNP     • risperiDONE  0 25 mg Oral Q4H PRN Max 6/day WilianIBTgamess Karyle Sorenson     • risperiDONE  0 5 mg Oral Q4H PRN Max 3/day Sherill Camilo, CRNP     • risperiDONE  1 mg Oral Q2H PRN Max 3/day Sherill Camilo, CRNP     • traZODone  50 mg Oral HS Daniel Cedeño MD          PRN:  •  acetaminophen  •  acetaminophen  •  acetaminophen  •  haloperidol lactate  •  hydrOXYzine HCL  •  hydrOXYzine HCL  •  hydrOXYzine HCL  •  LORazepam  •  LORazepam  •  risperiDONE  •  risperiDONE  •  risperiDONE    - Observation: routine    - VS: as per unit protocol  - Diet: Regular diet  - Psychoeducation (benefits and potential risks) discussed, importance of compliance with the psychiatric treatment reiterated, and the patient verbalized understanding of the matter  - Encourage group attendance and milieu therapy    - The pt was educated and agreed to verbalize any suicidal thoughts, frustrations or concerns to the nursing staff, immediately  - Dispo: Tentative discharge tomorrow       Next of Kin  · Extended Emergency Contact Information  · Primary Emergency Contact: German Jeffery  · Mobile Phone: [phone]  · Relation: Daughter  · Secondary Emergency Contact: Filiberto Foleyam  · Mobile Phone: [phone]  · Relation: Spouse      Counseling / Coordination of Care  Patient's progress discussed with staff in treatment team meeting  Medications, treatment progress and treatment plan reviewed with patient  Importance of medication and treatment compliance reviewed with patient  Supportive therapy provided to patient  Cognitive techniques utilized during the session  Reassurance and supportive therapy provided  Reoriented to reality and reassured  Encouraged participation in milieu and group therapy on the unit  Crisis/safety plan discussed with patient       Daniel Cedeño MD  Attending [id number]

## 2023-06-29 NOTE — DISCHARGE INSTR - OTHER ORDERS
You are being discharged to your residence: [place]    If you are unable to deal with your distressed mood alone please contact provider Dr Alex Parsons  If that is not effective and you continue to have suicidal ideation, homicidal ideation, distressed mood, overwhelmed, in crisis please contact [facility] # [phone], dial 911 or go to the nearest emergency center

## 2023-06-29 NOTE — NURSING NOTE
Bright Lady is calm, pleasant, and cooperative on approach  Bright Ladandrae denies all  Bright Lady states he is sleeping and eating well  Bright Lady attends group  Bright Lady is social with selected few  Rbight Lady is visible on the unit  Bright Lady is appropriate  Bright Lady is depressed  Bright Lady is med compliant   Will continue to monitor and assess for safety

## 2023-06-29 NOTE — NURSING NOTE
Patient is visible in the milieu, social with peers  Brightens on approach, compliant with meals and scheduled medications  Denies anxiety, depression, SI/HI/AH/VH  Able to make needs known  Safety checks ongoing

## 2023-06-29 NOTE — TREATMENT TEAM
06/29/23 0834   Team Meeting   Meeting Type Daily Rounds   Initial Conference Date 06/29/23   Team Members Present   Team Members Present Physician;Nurse;;; Occupational Therapist   Physician Team Member DR F[facility] Team Member [address] Management Team Member Stefani Garza Work Team Member Maru   OT Team Member Mirian Dandy T   Patient/Family Present   Patient Present No   Patient's Family Present No     Appears less tense, attended group but limited participation

## 2023-06-29 NOTE — PLAN OF CARE
Problem: Ineffective Coping  Goal: Identifies ineffective coping skills  Outcome: Progressing  Goal: Identifies healthy coping skills  Outcome: Progressing  Goal: Demonstrates healthy coping skills  Outcome: Progressing  Goal: Patient/Family participate in treatment and DC plans  Description: Interventions:  - Provide therapeutic environment  Outcome: Progressing  Goal: Patient/Family verbalizes awareness of resources  Outcome: Progressing  Goal: Understands least restrictive measures  Description: Interventions:  - Utilize least restrictive behavior  Outcome: Progressing  Goal: Free from restraint events  Description: - Utilize least restrictive measures   - Provide behavioral interventions   - Redirect inappropriate behaviors   Outcome: Progressing     Problem: Risk for Self Injury/Neglect  Goal: Treatment Goal: Remain safe during length of stay, learn and adopt new coping skills, and be free of self-injurious ideation, impulses and acts at the time of discharge  Outcome: Progressing  Goal: Verbalize thoughts and feelings  Description: Interventions:  - Assess and re-assess patient's lethality and potential for self-injury  - Engage patient in 1:1 interactions, daily, for a minimum of 15 minutes  - Encourage patient to express feelings, fears, frustrations, hopes  - Establish rapport/trust with patient   Outcome: Progressing  Goal: Refrain from harming self  Description: Interventions:  - Monitor patient closely, per order  - Develop a trusting relationship  - Supervise medication ingestion, monitor effects and side effects   Outcome: Progressing  Goal: Attend and participate in unit activities, including therapeutic, recreational, and educational groups  Description: Interventions:  - Provide therapeutic and educational activities daily, encourage attendance and participation, and document same in the medical record  - Obtain collateral information, encourage visitation and family involvement in care   Outcome: Progressing  Goal: Recognize maladaptive responses and adopt new coping mechanisms  Outcome: Progressing  Goal: Complete daily ADLs, including personal hygiene independently, as able  Description: Interventions:  - Observe, teach, and assist patient with ADLS  - Monitor and promote a balance of rest/activity, with adequate nutrition and elimination  Outcome: Progressing     Problem: Depression  Goal: Treatment Goal: Demonstrate behavioral control of depressive symptoms, verbalize feelings of improved mood/affect, and adopt new coping skills prior to discharge  Outcome: Progressing  Goal: Verbalize thoughts and feelings  Description: Interventions:  - Assess and re-assess patient's level of risk   - Engage patient in 1:1 interactions, daily, for a minimum of 15 minutes   - Encourage patient to express feelings, fears, frustrations, hopes   Outcome: Progressing  Goal: Refrain from harming self  Description: Interventions:  - Monitor patient closely, per order   - Supervise medication ingestion, monitor effects and side effects   Outcome: Progressing  Goal: Refrain from isolation  Description: Interventions:  - Develop a trusting relationship   - Encourage socialization   Outcome: Progressing  Goal: Refrain from self-neglect  Outcome: Progressing  Goal: Attend and participate in unit activities, including therapeutic, recreational, and educational groups  Description: Interventions:  - Provide therapeutic and educational activities daily, encourage attendance and participation, and document same in the medical record   Outcome: Progressing  Goal: Complete daily ADLs, including personal hygiene independently, as able  Description: Interventions:  - Observe, teach, and assist patient with ADLS  -  Monitor and promote a balance of rest/activity, with adequate nutrition and elimination   Outcome: Progressing     Problem: Anxiety  Goal: Anxiety is at manageable level  Description: Interventions:  - Assess and monitor patient's anxiety level  - Monitor for signs and symptoms (heart palpitations, chest pain, shortness of breath, headaches, nausea, feeling jumpy, restlessness, irritable, apprehensive)  - Collaborate with interdisciplinary team and initiate plan and interventions as ordered    - [place] patient to unit/surroundings  - Explain treatment plan  - Encourage participation in care  - Encourage verbalization of concerns/fears  - Identify coping mechanisms  - Assist in developing anxiety-reducing skills  - Administer/offer alternative therapies  - Limit or eliminate stimulants  Outcome: Progressing     Problem: DISCHARGE PLANNING - CARE MANAGEMENT  Goal: Discharge to post-acute care or home with appropriate resources  Description: INTERVENTIONS:  - Conduct assessment to determine patient/family and health care team treatment goals, and need for post-acute services based on payer coverage, community resources, and patient preferences, and barriers to discharge  - Address psychosocial, clinical, and financial barriers to discharge as identified in assessment in conjunction with the patient/family and health care team  - Arrange appropriate level of post-acute services according to patient’s   needs and preference and payer coverage in collaboration with the physician and health care team  - Communicate with and update the patient/family, physician, and health care team regarding progress on the discharge plan  - Arrange appropriate transportation to post-acute venues  Outcome: Progressing

## 2023-06-29 NOTE — PROGRESS NOTES
P:t attended group and stayed for duration,  Pt did not engage in discussion when prompted  06/29/23 1000   Activity/Group Checklist   Group Other (Comment)  (MH Relapse, crisis and warmline)   Attendance Attended   Attendance Duration (min) 31-45   Interactions Did not interact   Affect/Mood Constricted   Goals Achieved Identified feelings; Identified relapse prevention strategies; Able to listen to others; Identified resources and support systems

## 2023-06-29 NOTE — CASE MANAGEMENT
CM confirmed a follow up appt with Dr Rzea Mullins @ [place] ([phone]) for 07/05/2023 @ 3:15pm  CM also confirmed a follow up appt  with the patient's PCP (information provided by the patients daughter, Armando Gao Dr Tally Hodgkin with [facility] ([phone]) for 07/10/2023 @ 3:00pm     CM to fax Psych with the patient's d/c summary @ [phone]  CM to fax PCP with the patient's d/c summary @ [phone]

## 2023-06-29 NOTE — PLAN OF CARE
Problem: Ineffective Coping  Goal: Identifies ineffective coping skills  Outcome: Progressing  Goal: Identifies healthy coping skills  Outcome: Progressing  Goal: Demonstrates healthy coping skills  Outcome: Progressing  Goal: Patient/Family participate in treatment and DC plans  Description: Interventions:  - Provide therapeutic environment  Outcome: Progressing  Goal: Patient/Family verbalizes awareness of resources  Outcome: Progressing  Goal: Understands least restrictive measures  Description: Interventions:  - Utilize least restrictive behavior  Outcome: Progressing  Goal: Free from restraint events  Description: - Utilize least restrictive measures   - Provide behavioral interventions   - Redirect inappropriate behaviors   Outcome: Progressing     Problem: Risk for Self Injury/Neglect  Goal: Treatment Goal: Remain safe during length of stay, learn and adopt new coping skills, and be free of self-injurious ideation, impulses and acts at the time of discharge  Outcome: Progressing  Goal: Verbalize thoughts and feelings  Description: Interventions:  - Assess and re-assess patient's lethality and potential for self-injury  - Engage patient in 1:1 interactions, daily, for a minimum of 15 minutes  - Encourage patient to express feelings, fears, frustrations, hopes  - Establish rapport/trust with patient   Outcome: Progressing  Goal: Refrain from harming self  Description: Interventions:  - Monitor patient closely, per order  - Develop a trusting relationship  - Supervise medication ingestion, monitor effects and side effects   Outcome: Progressing  Goal: Attend and participate in unit activities, including therapeutic, recreational, and educational groups  Description: Interventions:  - Provide therapeutic and educational activities daily, encourage attendance and participation, and document same in the medical record  - Obtain collateral information, encourage visitation and family involvement in care   Outcome: Progressing  Goal: Recognize maladaptive responses and adopt new coping mechanisms  Outcome: Progressing  Goal: Complete daily ADLs, including personal hygiene independently, as able  Description: Interventions:  - Observe, teach, and assist patient with ADLS  - Monitor and promote a balance of rest/activity, with adequate nutrition and elimination  Outcome: Progressing     Problem: Depression  Goal: Treatment Goal: Demonstrate behavioral control of depressive symptoms, verbalize feelings of improved mood/affect, and adopt new coping skills prior to discharge  Outcome: Progressing  Goal: Verbalize thoughts and feelings  Description: Interventions:  - Assess and re-assess patient's level of risk   - Engage patient in 1:1 interactions, daily, for a minimum of 15 minutes   - Encourage patient to express feelings, fears, frustrations, hopes   Outcome: Progressing  Goal: Refrain from harming self  Description: Interventions:  - Monitor patient closely, per order   - Supervise medication ingestion, monitor effects and side effects   Outcome: Progressing  Goal: Refrain from isolation  Description: Interventions:  - Develop a trusting relationship   - Encourage socialization   Outcome: Progressing  Goal: Refrain from self-neglect  Outcome: Progressing  Goal: Attend and participate in unit activities, including therapeutic, recreational, and educational groups  Description: Interventions:  - Provide therapeutic and educational activities daily, encourage attendance and participation, and document same in the medical record   Outcome: Progressing  Goal: Complete daily ADLs, including personal hygiene independently, as able  Description: Interventions:  - Observe, teach, and assist patient with ADLS  -  Monitor and promote a balance of rest/activity, with adequate nutrition and elimination   Outcome: Progressing     Problem: Anxiety  Goal: Anxiety is at manageable level  Description: Interventions:  - Assess and monitor patient's anxiety level  - Monitor for signs and symptoms (heart palpitations, chest pain, shortness of breath, headaches, nausea, feeling jumpy, restlessness, irritable, apprehensive)  - Collaborate with interdisciplinary team and initiate plan and interventions as ordered    - [place] patient to unit/surroundings  - Explain treatment plan  - Encourage participation in care  - Encourage verbalization of concerns/fears  - Identify coping mechanisms  - Assist in developing anxiety-reducing skills  - Administer/offer alternative therapies  - Limit or eliminate stimulants  Outcome: Progressing     Problem: DISCHARGE PLANNING - CARE MANAGEMENT  Goal: Discharge to post-acute care or home with appropriate resources  Description: INTERVENTIONS:  - Conduct assessment to determine patient/family and health care team treatment goals, and need for post-acute services based on payer coverage, community resources, and patient preferences, and barriers to discharge  - Address psychosocial, clinical, and financial barriers to discharge as identified in assessment in conjunction with the patient/family and health care team  - Arrange appropriate level of post-acute services according to patient’s   needs and preference and payer coverage in collaboration with the physician and health care team  - Communicate with and update the patient/family, physician, and health care team regarding progress on the discharge plan  - Arrange appropriate transportation to post-acute venues  Outcome: Progressing

## 2023-06-30 VITALS
RESPIRATION RATE: 16 BRPM | HEART RATE: 78 BPM | TEMPERATURE: 98.5 F | BODY MASS INDEX: 33.18 KG/M2 | OXYGEN SATURATION: 98 % | WEIGHT: 211.42 LBS | HEIGHT: 67 IN | DIASTOLIC BLOOD PRESSURE: 66 MMHG | SYSTOLIC BLOOD PRESSURE: 105 MMHG

## 2023-06-30 PROCEDURE — 99238 HOSP IP/OBS DSCHRG MGMT 30/<: CPT | Performed by: STUDENT IN AN ORGANIZED HEALTH CARE EDUCATION/TRAINING PROGRAM

## 2023-06-30 RX ADMIN — CYANOCOBALAMIN TAB 1000 MCG 1000 MCG: 1000 TAB at 08:09

## 2023-06-30 RX ADMIN — CHOLECALCIFEROL TAB 25 MCG (1000 UNIT) 1000 UNITS: 25 TAB at 08:09

## 2023-06-30 NOTE — NURSING NOTE
Patient is alert and oriented, able to make his needs known  Denies depression, anxiety/SI/HI/AVH  Pt is calm and pleasant on approach, interacting with peers  Pt is medication compliant  No other issues or concerns observed at this time   Safety checks ongoing

## 2023-06-30 NOTE — PLAN OF CARE
Problem: Ineffective Coping  Goal: Demonstrates healthy coping skills  Outcome: Progressing     Problem: Ineffective Coping  Goal: Identifies healthy coping skills  Outcome: Progressing     Problem: Risk for Self Injury/Neglect  Goal: Complete daily ADLs, including personal hygiene independently, as able  Description: Interventions:  - Observe, teach, and assist patient with ADLS  - Monitor and promote a balance of rest/activity, with adequate nutrition and elimination  Outcome: Progressing

## 2023-06-30 NOTE — PLAN OF CARE
Problem: Ineffective Coping  Goal: Identifies ineffective coping skills  Outcome: Completed  Goal: Identifies healthy coping skills  Outcome: Completed  Goal: Demonstrates healthy coping skills  Outcome: Completed  Goal: Patient/Family participate in treatment and DC plans  Description: Interventions:  - Provide therapeutic environment  Outcome: Completed  Goal: Patient/Family verbalizes awareness of resources  Outcome: Completed  Goal: Understands least restrictive measures  Description: Interventions:  - Utilize least restrictive behavior  Outcome: Completed  Goal: Free from restraint events  Description: - Utilize least restrictive measures   - Provide behavioral interventions   - Redirect inappropriate behaviors   Outcome: Completed     Problem: Risk for Self Injury/Neglect  Goal: Treatment Goal: Remain safe during length of stay, learn and adopt new coping skills, and be free of self-injurious ideation, impulses and acts at the time of discharge  Outcome: Completed  Goal: Verbalize thoughts and feelings  Description: Interventions:  - Assess and re-assess patient's lethality and potential for self-injury  - Engage patient in 1:1 interactions, daily, for a minimum of 15 minutes  - Encourage patient to express feelings, fears, frustrations, hopes  - Establish rapport/trust with patient   Outcome: Completed  Goal: Refrain from harming self  Description: Interventions:  - Monitor patient closely, per order  - Develop a trusting relationship  - Supervise medication ingestion, monitor effects and side effects   Outcome: Completed  Goal: Attend and participate in unit activities, including therapeutic, recreational, and educational groups  Description: Interventions:  - Provide therapeutic and educational activities daily, encourage attendance and participation, and document same in the medical record  - Obtain collateral information, encourage visitation and family involvement in care   Outcome: Completed  Goal: Recognize maladaptive responses and adopt new coping mechanisms  Outcome: Completed  Goal: Complete daily ADLs, including personal hygiene independently, as able  Description: Interventions:  - Observe, teach, and assist patient with ADLS  - Monitor and promote a balance of rest/activity, with adequate nutrition and elimination  Outcome: Completed     Problem: Depression  Goal: Treatment Goal: Demonstrate behavioral control of depressive symptoms, verbalize feelings of improved mood/affect, and adopt new coping skills prior to discharge  Outcome: Completed  Goal: Verbalize thoughts and feelings  Description: Interventions:  - Assess and re-assess patient's level of risk   - Engage patient in 1:1 interactions, daily, for a minimum of 15 minutes   - Encourage patient to express feelings, fears, frustrations, hopes   Outcome: Completed  Goal: Refrain from harming self  Description: Interventions:  - Monitor patient closely, per order   - Supervise medication ingestion, monitor effects and side effects   Outcome: Completed  Goal: Refrain from isolation  Description: Interventions:  - Develop a trusting relationship   - Encourage socialization   Outcome: Completed  Goal: Refrain from self-neglect  Outcome: Completed  Goal: Attend and participate in unit activities, including therapeutic, recreational, and educational groups  Description: Interventions:  - Provide therapeutic and educational activities daily, encourage attendance and participation, and document same in the medical record   Outcome: Completed  Goal: Complete daily ADLs, including personal hygiene independently, as able  Description: Interventions:  - Observe, teach, and assist patient with ADLS  -  Monitor and promote a balance of rest/activity, with adequate nutrition and elimination   Outcome: Completed     Problem: Anxiety  Goal: Anxiety is at manageable level  Description: Interventions:  - Assess and monitor patient's anxiety level     - Monitor for signs and symptoms (heart palpitations, chest pain, shortness of breath, headaches, nausea, feeling jumpy, restlessness, irritable, apprehensive)  - Collaborate with interdisciplinary team and initiate plan and interventions as ordered    - [place] patient to unit/surroundings  - Explain treatment plan  - Encourage participation in care  - Encourage verbalization of concerns/fears  - Identify coping mechanisms  - Assist in developing anxiety-reducing skills  - Administer/offer alternative therapies  - Limit or eliminate stimulants  Outcome: Completed     Problem: DISCHARGE PLANNING - CARE MANAGEMENT  Goal: Discharge to post-acute care or home with appropriate resources  Description: INTERVENTIONS:  - Conduct assessment to determine patient/family and health care team treatment goals, and need for post-acute services based on payer coverage, community resources, and patient preferences, and barriers to discharge  - Address psychosocial, clinical, and financial barriers to discharge as identified in assessment in conjunction with the patient/family and health care team  - Arrange appropriate level of post-acute services according to patient’s   needs and preference and payer coverage in collaboration with the physician and health care team  - Communicate with and update the patient/family, physician, and health care team regarding progress on the discharge plan  - Arrange appropriate transportation to post-acute venues  Outcome: Completed

## 2023-06-30 NOTE — PLAN OF CARE
Problem: DISCHARGE PLANNING - CARE MANAGEMENT  Goal: Discharge to post-acute care or home with appropriate resources  Description: INTERVENTIONS:  - Conduct assessment to determine patient/family and health care team treatment goals, and need for post-acute services based on payer coverage, community resources, and patient preferences, and barriers to discharge  - Address psychosocial, clinical, and financial barriers to discharge as identified in assessment in conjunction with the patient/family and health care team  - Arrange appropriate level of post-acute services according to patient's   needs and preference and payer coverage in collaboration with the physician and health care team  - Communicate with and update the patient/family, physician, and health care team regarding progress on the discharge plan  - Arrange appropriate transportation to post-acute venues  Outcome: Adequate for Discharge     The patient is cleared for d/c today home with spouse   The patient's spouse will pick the patient up at 1:00pm  The patient has follow up outpatient Psych and PCP appointments scheduled, both on S
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
## 2023-06-30 NOTE — CASE MANAGEMENT
CM spoke with the patient who was aware his wife would be picking him up tomorrow for discharge at 1:00pm  CM made the patient aware of his follow up appointments with his PCP and outpatient Psychiatrist  The patient was in agreement with the discharge plan and reported having no further questions or concerns

## 2023-06-30 NOTE — NURSING NOTE
Patient calm and cooperative  Denies all s/s at this time  Reports being ready to go home to his family  Able to make needs known; denies any needs at this time  Safety checks ongoing

## 2023-06-30 NOTE — NURSING NOTE
Reviewed AVS with patient who verbalized understanding  Denies any questions at this time  Patient and all belongings escorted off unit by T

## 2023-06-30 NOTE — PROGRESS NOTES
06/30/23 0834   Team Meeting   Meeting Type Daily Rounds   Initial Conference Date 06/30/23   Team Members Present   Team Members Present Physician;Nurse;;; Occupational Therapist   Physician Team Member Dr Oneyda Briseno   Nursing Team Member lBu Matthew  Management Team Member Vishal July, [address] [employer] Work Team Member Maru   OT Team Member Cady Douglas  Patient/Family Present   Patient Present No   Patient's Family Present No     Visible, social, more cooperative  Excited to d/c home today at 1:00pm, spouse providing transportation

## 2025-02-21 ENCOUNTER — APPOINTMENT (EMERGENCY)
Dept: CT IMAGING | Facility: HOSPITAL | Age: 55
End: 2025-02-21
Payer: COMMERCIAL

## 2025-02-21 ENCOUNTER — HOSPITAL ENCOUNTER (EMERGENCY)
Facility: HOSPITAL | Age: 55
Discharge: HOME | End: 2025-02-21
Attending: EMERGENCY MEDICINE
Payer: COMMERCIAL

## 2025-02-21 VITALS
TEMPERATURE: 97.2 F | RESPIRATION RATE: 22 BRPM | OXYGEN SATURATION: 98 % | SYSTOLIC BLOOD PRESSURE: 157 MMHG | DIASTOLIC BLOOD PRESSURE: 89 MMHG | HEART RATE: 86 BPM

## 2025-02-21 DIAGNOSIS — K21.9 GERD (GASTROESOPHAGEAL REFLUX DISEASE): ICD-10-CM

## 2025-02-21 DIAGNOSIS — M54.9 RIGHT-SIDED BACK PAIN: Primary | ICD-10-CM

## 2025-02-21 LAB
ALBUMIN SERPL BCG-MCNC: 4.3 G/DL (ref 3.5–5)
ALP SERPL-CCNC: 90 U/L (ref 34–104)
ALT SERPL W P-5'-P-CCNC: 30 U/L (ref 7–52)
ANION GAP SERPL CALCULATED.3IONS-SCNC: 7 MMOL/L (ref 4–13)
AST SERPL W P-5'-P-CCNC: 16 U/L (ref 13–39)
BASOPHILS # BLD AUTO: 0.04 THOUSANDS/ΜL (ref 0–0.1)
BASOPHILS NFR BLD AUTO: 1 % (ref 0–1)
BILIRUB DIRECT SERPL-MCNC: 0.09 MG/DL (ref 0–0.2)
BILIRUB SERPL-MCNC: 0.94 MG/DL (ref 0.2–1)
BUN SERPL-MCNC: 15 MG/DL (ref 5–25)
CALCIUM SERPL-MCNC: 9.1 MG/DL (ref 8.4–10.2)
CHLORIDE SERPL-SCNC: 102 MMOL/L (ref 96–108)
CO2 SERPL-SCNC: 27 MMOL/L (ref 21–32)
CREAT SERPL-MCNC: 1.1 MG/DL (ref 0.6–1.3)
EOSINOPHIL # BLD AUTO: 0.14 THOUSAND/ΜL (ref 0–0.61)
EOSINOPHIL NFR BLD AUTO: 2 % (ref 0–6)
ERYTHROCYTE [DISTWIDTH] IN BLOOD BY AUTOMATED COUNT: 13.2 % (ref 11.6–15.1)
GFR SERPL CREATININE-BSD FRML MDRD: 75 ML/MIN/1.73SQ M
GLUCOSE SERPL-MCNC: 128 MG/DL (ref 65–140)
HCT VFR BLD AUTO: 45.7 % (ref 36.5–49.3)
HGB BLD-MCNC: 15.3 G/DL (ref 12–17)
IMM GRANULOCYTES # BLD AUTO: 0.05 THOUSAND/UL (ref 0–0.2)
IMM GRANULOCYTES NFR BLD AUTO: 1 % (ref 0–2)
LIPASE SERPL-CCNC: 15 U/L (ref 11–82)
LYMPHOCYTES # BLD AUTO: 2.39 THOUSANDS/ΜL (ref 0.6–4.47)
LYMPHOCYTES NFR BLD AUTO: 34 % (ref 14–44)
MCH RBC QN AUTO: 29.3 PG (ref 26.8–34.3)
MCHC RBC AUTO-ENTMCNC: 33.5 G/DL (ref 31.4–37.4)
MCV RBC AUTO: 87 FL (ref 82–98)
MONOCYTES # BLD AUTO: 0.71 THOUSAND/ΜL (ref 0.17–1.22)
MONOCYTES NFR BLD AUTO: 10 % (ref 4–12)
NEUTROPHILS # BLD AUTO: 3.77 THOUSANDS/ΜL (ref 1.85–7.62)
NEUTS SEG NFR BLD AUTO: 52 % (ref 43–75)
NRBC BLD AUTO-RTO: 0 /100 WBCS
PLATELET # BLD AUTO: 202 THOUSANDS/UL (ref 149–390)
PMV BLD AUTO: 10.9 FL (ref 8.9–12.7)
POTASSIUM SERPL-SCNC: 4.2 MMOL/L (ref 3.5–5.3)
PROT SERPL-MCNC: 6.9 G/DL (ref 6.4–8.4)
RBC # BLD AUTO: 5.23 MILLION/UL (ref 3.88–5.62)
SODIUM SERPL-SCNC: 136 MMOL/L (ref 135–147)
WBC # BLD AUTO: 7.1 THOUSAND/UL (ref 4.31–10.16)

## 2025-02-21 PROCEDURE — 80076 HEPATIC FUNCTION PANEL: CPT | Performed by: EMERGENCY MEDICINE

## 2025-02-21 PROCEDURE — 85025 COMPLETE CBC W/AUTO DIFF WBC: CPT | Performed by: EMERGENCY MEDICINE

## 2025-02-21 PROCEDURE — 74177 CT ABD & PELVIS W/CONTRAST: CPT

## 2025-02-21 PROCEDURE — 96374 THER/PROPH/DIAG INJ IV PUSH: CPT

## 2025-02-21 PROCEDURE — 80048 BASIC METABOLIC PNL TOTAL CA: CPT | Performed by: EMERGENCY MEDICINE

## 2025-02-21 PROCEDURE — 36415 COLL VENOUS BLD VENIPUNCTURE: CPT | Performed by: EMERGENCY MEDICINE

## 2025-02-21 PROCEDURE — 99285 EMERGENCY DEPT VISIT HI MDM: CPT | Performed by: EMERGENCY MEDICINE

## 2025-02-21 PROCEDURE — 99284 EMERGENCY DEPT VISIT MOD MDM: CPT

## 2025-02-21 PROCEDURE — 96375 TX/PRO/DX INJ NEW DRUG ADDON: CPT

## 2025-02-21 PROCEDURE — 83690 ASSAY OF LIPASE: CPT | Performed by: EMERGENCY MEDICINE

## 2025-02-21 RX ORDER — KETOROLAC TROMETHAMINE 30 MG/ML
15 INJECTION, SOLUTION INTRAMUSCULAR; INTRAVENOUS ONCE
Status: COMPLETED | OUTPATIENT
Start: 2025-02-21 | End: 2025-02-21

## 2025-02-21 RX ORDER — PANTOPRAZOLE SODIUM 40 MG/1
40 TABLET, DELAYED RELEASE ORAL DAILY
Qty: 30 TABLET | Refills: 0 | Status: SHIPPED | OUTPATIENT
Start: 2025-02-21

## 2025-02-21 RX ORDER — PANTOPRAZOLE SODIUM 40 MG/10ML
40 INJECTION, POWDER, LYOPHILIZED, FOR SOLUTION INTRAVENOUS ONCE
Status: COMPLETED | OUTPATIENT
Start: 2025-02-21 | End: 2025-02-21

## 2025-02-21 RX ADMIN — PANTOPRAZOLE SODIUM 40 MG: 40 INJECTION, POWDER, FOR SOLUTION INTRAVENOUS at 07:50

## 2025-02-21 RX ADMIN — IOHEXOL 100 ML: 350 INJECTION, SOLUTION INTRAVENOUS at 08:11

## 2025-02-21 RX ADMIN — KETOROLAC TROMETHAMINE 15 MG: 30 INJECTION, SOLUTION INTRAMUSCULAR; INTRAVENOUS at 07:50

## 2025-02-21 NOTE — DISCHARGE INSTRUCTIONS
Take ibuprofen (Motrin, Advil) or acetaminophen (Tylenol) as needed for pain, as per the instructions.  Use ice or heat as it helps.  Use topical medications, such as Christos-Ackerman or icy Hot, to the area, to help with the pain.  Do stretching exercises to keep the muscles of your back loose.  Avoid foods that cause worsening symptoms in your stomach.  Anti-inflammatories, such as ibuprofen (Motrin, Advil) or naproxen (Naprosyn, Aleve) may worsen reflux symptoms.  Follow up your primary care doctor and [facility] for further evaluation and management of your back pain.  With a GI doctor for further evaluation of your reflux symptoms.  Return for worsening or changing symptoms, or for any other concerns.

## 2025-02-21 NOTE — ED PROVIDER NOTES
Time reflects when diagnosis was documented in both MDM as applicable and the Disposition within this note       Time User Action Codes Description Comment    2/21/2025  8:55 AM Cris Benitez [M54.9] Right-sided back pain     2/21/2025  8:55 AM Cris Benitez [K21.9] GERD (gastroesophageal reflux disease)           ED Disposition       ED Disposition   Discharge    Condition   Good    Date/Time   Fri Feb 21, 2025  8:48 AM    Comment   Carlos Lawrencekiah discharge to home/self care.             Assessment & Plan       Medical Decision Making  This is a 54-year-old male who presents here today for right-sided back pain.  He says it has been bothering him for several months, but he saw his doctor who got a CAT scan and has not otherwise done anything for his pain.  He has not been taking any medications or doing anything to help with the pain.  He says it is always there to some extent but worsens with certain movements, particularly bending to the right or twisting.  He denies falls or trauma to the area but says it started after he had pneumonia and was coughing frequently.  There is no radiation outside of the area.  He does note problems with frequent burping after eating some foods, usually coffee or large amounts of food, and this sometimes worsens the pain in his back.  He denies any chest pain, shortness of breath, recent coughing, fevers or other URI symptoms.  There is no nausea or vomiting.  He has no urinary symptoms.  He feels like his doctor is not doing anything to help his pain and since it is still persisting, came here for evaluation.  He was seen in urgent care 12/12 complaining of right back pain, prescribed naproxen and Flexeril.  He had a UA that was negative.  He followed up with his doctor 12/14, complaining of pain at that time to the lower right rib region.  CT scan of the chest was obtained which showed small lung nodules, but no other acute abnormalities.  He  had a follow-up visit with his doctor 1/9 discussed results, and he was not prescribed any additional medications or told to do anything else regarding his pain.  He has not followed up with his doctor since then.  Denies prior problems with heartburn, reflux, indigestion.    ROS: Otherwise negative, unless stated as above.    He is well-appearing, no acute distress.  He has mild epigastric tenderness but no significant right upper quadrant tenderness or any Zavala sign.  He has tenderness to the lower most ribs posteriorly on the right.  He has no CVA tenderness.  Exam is otherwise unremarkable.  This is most likely continued muscular pain, incompletely treated by the patient.  However, with occasional worsening with food and reflux sounding symptoms, there may be a component of referred pain from intra-abdominal etiology, such as gastritis, or cholelithiasis.  He is not having significant abdominal pain or tenderness to raise high suspicion for cholecystitis.  We will get lab work and CT scan to evaluate, and treat symptoms.    Work is unremarkable.  CT scan shows no acute abnormalities to explain symptoms.  I discussed with him findings, continued management of symptoms at home, follow-up, and indications for return, and he expresses understanding with this plan.    Problems Addressed:  GERD (gastroesophageal reflux disease): acute illness or injury  Right-sided back pain: acute illness or injury    Amount and/or Complexity of Data Reviewed  Labs: ordered. Decision-making details documented in ED Course.  Radiology: ordered and independent interpretation performed. Decision-making details documented in ED Course.    Risk  Prescription drug management.             Medications   pantoprazole (PROTONIX) injection 40 mg (40 mg Intravenous Given 2/21/25 0750)   ketorolac (TORADOL) injection 15 mg (15 mg Intravenous Given 2/21/25 0750)   iohexol (OMNIPAQUE) 350 MG/ML injection (MULTI-DOSE) 100 mL (100 mL Intravenous  Given 2/21/25 0811)       ED Risk Strat Scores                                                History of Present Illness       Chief Complaint   Patient presents with    Back Pain     C/o lower back pain for 3-4 months, states pain is getting worse.        Past Medical History:   Diagnosis Date    Anxiety     ETOH abuse     Nasal congestion       History reviewed. No pertinent surgical history.   History reviewed. No pertinent family history.   Social History     Tobacco Use    Smoking status: Former     Types: Cigarettes     Passive exposure: Never    Smokeless tobacco: Never   Vaping Use    Vaping status: Never Used   Substance Use Topics    Alcohol use: Not Currently     Comment: Pt denies use of ETOH at this time    Drug use: Never      E-Cigarette/Vaping    E-Cigarette Use Never User       E-Cigarette/Vaping Substances    Nicotine No     THC No     CBD No     Flavoring No     Other No     Unknown No       I have reviewed and agree with the history as documented.       Back Pain      Review of Systems   Musculoskeletal:  Positive for back pain.           Objective       ED Triage Vitals   Temperature Pulse Blood Pressure Respirations SpO2 Patient Position - Orthostatic VS   02/21/25 0720 02/21/25 0720 02/21/25 0720 02/21/25 0720 02/21/25 0720 02/21/25 0720   (!) 97.2 °F (36.2 °C) 86 157/89 22 98 % Sitting      Temp Source Heart Rate Source BP Location FiO2 (%) Pain Score    02/21/25 0720 02/21/25 0720 02/21/25 0720 -- 02/21/25 0750    Temporal Monitor Left arm  5      Vitals      Date and Time Temp Pulse SpO2 Resp BP Pain Score FACES Pain Rating User   02/21/25 0750 -- -- -- -- -- 5 -- FH   02/21/25 0720 97.2 °F (36.2 °C) 86 98 % 22 157/89 -- -- GP            Physical Exam  Vitals and nursing note reviewed.   Constitutional:       General: He is not in acute distress.     Appearance: He is well-developed. He is obese.   HENT:      Head: Normocephalic and atraumatic.      Mouth/Throat:      Mouth: Mucous  membranes are moist.   Eyes:      Conjunctiva/sclera: Conjunctivae normal.      Pupils: Pupils are equal, round, and reactive to light.   Neck:      Trachea: No tracheal deviation.   Cardiovascular:      Rate and Rhythm: Normal rate and regular rhythm.      Heart sounds: Normal heart sounds.   Pulmonary:      Effort: Pulmonary effort is normal. No respiratory distress.      Breath sounds: Normal breath sounds.   Abdominal:      General: Bowel sounds are normal. There is no distension.      Palpations: Abdomen is soft.      Tenderness: There is abdominal tenderness (very mild) in the epigastric area. There is no right CVA tenderness, left CVA tenderness, guarding or rebound. Negative signs include Zavala's sign.   Musculoskeletal:      Cervical back: Normal range of motion.      Thoracic back: Tenderness (mild, along lower most ribs) present. No bony tenderness. Normal range of motion.      Lumbar back: No tenderness or bony tenderness.        Back:       Comments: Able to sit up in bed without difficulties.  Occasionally complains of worsening with twisting   Skin:     General: Skin is warm and dry.   Neurological:      Mental Status: He is alert and oriented to person, place, and time.      GCS: GCS eye subscore is 4. GCS verbal subscore is 5. GCS motor subscore is 6.   Psychiatric:         Mood and Affect: Mood and affect normal.         Behavior: Behavior normal.         Results Reviewed       Procedure Component Value Units Date/Time    Basic metabolic panel [[id number]] Collected: 02/21/25 0749    Lab Status: Final result Specimen: Blood from Arm, Right Updated: 02/21/25 0813     Sodium 136 mmol/L      Potassium 4.2 mmol/L      Chloride 102 mmol/L      CO2 27 mmol/L      ANION GAP 7 mmol/L      BUN 15 mg/dL      Creatinine 1.10 mg/dL      Glucose 128 mg/dL      Calcium 9.1 mg/dL      eGFR 75 ml/min/1.73sq m     Narrative:      National Kid[employer] Foundation guidelines for Chronic Kidney Disease (CKD):      Stage 1 with normal or high GFR (GFR > 90 mL/min/1.73 square meters)    Stage 2 Mild CKD (GFR = 60-89 mL/min/1.73 square meters)    Stage 3A Moderate CKD (GFR = 45-59 mL/min/1.73 square meters)    Stage 3B Moderate CKD (GFR = 30-44 mL/min/1.73 square meters)    Stage 4 Severe CKD (GFR = 15-29 mL/min/1.73 square meters)    Stage 5 End Stage CKD (GFR <15 mL/min/1.73 square meters)  Note: GFR calculation is accurate only with a steady state creatinine    Hepatic function panel [[id number]]  (Normal) Collected: 02/21/25 0749    Lab Status: Final result Specimen: Blood from Arm, Right Updated: 02/21/25 0813     Total Bilirubin 0.94 mg/dL      Bilirubin, Direct 0.09 mg/dL      Alkaline Phosphatase 90 U/L      AST 16 U/L      ALT 30 U/L      Total Protein 6.9 g/dL      Albumin 4.3 g/dL     Lipase [[id number]]  (Normal) Collected: 02/21/25 0749    Lab Status: Final result Specimen: Blood from Arm, Right Updated: 02/21/25 0813     Lipase 15 u/L     CBC and differential [[id number]] Collected: 02/21/25 0749    Lab Status: Final result Specimen: Blood from Arm, Right Updated: 02/21/25 0757     WBC 7.10 Thousand/uL      RBC 5.23 Million/uL      Hemoglobin 15.3 g/dL      Hematocrit 45.7 %      MCV 87 fL      MCH 29.3 pg      MCHC 33.5 g/dL      RDW 13.2 %      MPV 10.9 fL      Platelets 202 Thousands/uL      nRBC 0 /100 WBCs      Segmented % 52 %      Immature Grans % 1 %      Lymphocytes % 34 %      Monocytes % 10 %      Eosinophils Relative 2 %      Basophils Relative 1 %      Absolute Neutrophils 3.77 Thousands/µL      Absolute Immature Grans 0.05 Thousand/uL      Absolute Lymphocytes 2.39 Thousands/µL      Absolute Monocytes 0.71 Thousand/µL      Eosinophils Absolute 0.14 Thousand/µL      Basophils Absolute 0.04 Thousands/µL             CT abdomen pelvis with contrast   Final Interpretation by William Jorge MD (02/21 0824)      No acute abnormality in the abdomen or pelvis.      Hepatic steatosis.         Workstation  performed: [id number]             Procedures    ED Medication and Procedure Management   Prior to Admission Medications   Prescriptions Last Dose Informant Patient Reported? Taking?   ARIPiprazole (ABILIFY) 10 mg tablet   No No   Sig: Take 1 tablet (10 mg total) by mouth daily at bedtime   cholecalciferol (VITAMIN D3) 1,000 units tablet   No No   Sig: Take 1 tablet (1,000 Units total) by mouth daily Do not start before June 30, 2023.   cyanocobalamin (VITAMIN B-12) 1000 MCG tablet   No No   Sig: Take 1 tablet (1,000 mcg total) by mouth daily   escitalopram (Lexapro) 10 mg tablet   No No   Sig: Take 1 tablet (10 mg total) by mouth daily at bedtime   famotidine (PEPCID) 20 mg tablet   No No   Sig: Take 1 tablet (20 mg total) by mouth 2 (two) times a day   Patient not taking: Reported on 5/23/2022   traZODone (DESYREL) 50 mg tablet   No No   Sig: Take 1 tablet (50 mg total) by mouth daily at bedtime      Facility-Administered Medications: None     Patient's Medications   Discharge Prescriptions    DICLOFENAC SODIUM (VOLTAREN) 1 %    Apply 2 g topically 4 (four) times a day as needed (pain)       Start Date: 2/21/2025 End Date: --       Order Dose: 2 g       Quantity: 350 g    Refills: 0    PANTOPRAZOLE (PROTONIX) 40 MG TABLET    Take 1 tablet (40 mg total) by mouth daily       Start Date: 2/21/2025 End Date: --       Order Dose: 40 mg       Quantity: 30 tablet    Refills: 0       ED SEPSIS DOCUMENTATION   Time reflects when diagnosis was documented in both MDM as applicable and the Disposition within this note       Time User Action Codes Description Comment    2/21/2025  8:55 AM Cris Benitez [M54.9] Right-sided back pain     2/21/2025  8:55 AM Cris Benitez [K21.9] GERD (gastroesophageal reflux disease)                  Cris Benitez MD  02/21/25 0944

## 2025-02-24 ENCOUNTER — TELEPHONE (OUTPATIENT)
Dept: PHYSICAL THERAPY | Facility: OTHER | Age: 55
End: 2025-02-24

## 2025-02-24 NOTE — TELEPHONE ENCOUNTER
Call placed to the patient per [facility] referral.    Spoke with patient, explained program and reason for the call.    Patient declined services at the moment. He will call back.    [facility] phone number and hours of business provided.     Referral closed per protocol. Will assist patient when he calls back.

## 2025-02-25 ENCOUNTER — CONSULT (OUTPATIENT)
Dept: GASTROENTEROLOGY | Facility: CLINIC | Age: 55
End: 2025-02-25
Payer: COMMERCIAL

## 2025-02-25 VITALS
WEIGHT: 250 LBS | SYSTOLIC BLOOD PRESSURE: 110 MMHG | TEMPERATURE: 96.9 F | HEART RATE: 92 BPM | OXYGEN SATURATION: 96 % | HEIGHT: 67 IN | RESPIRATION RATE: 16 BRPM | BODY MASS INDEX: 39.24 KG/M2 | DIASTOLIC BLOOD PRESSURE: 80 MMHG

## 2025-02-25 DIAGNOSIS — R10.11 RUQ PAIN: Primary | ICD-10-CM

## 2025-02-25 DIAGNOSIS — Z12.11 SCREENING FOR COLON CANCER: ICD-10-CM

## 2025-02-25 DIAGNOSIS — K21.9 GERD (GASTROESOPHAGEAL REFLUX DISEASE): ICD-10-CM

## 2025-02-25 DIAGNOSIS — R10.9 RIGHT FLANK PAIN: ICD-10-CM

## 2025-02-25 PROCEDURE — 99203 OFFICE O/P NEW LOW 30 MIN: CPT | Performed by: PHYSICIAN ASSISTANT

## 2025-02-25 RX ORDER — FENOFIBRATE 145 MG/1
145 TABLET, COATED ORAL DAILY
COMMUNITY
Start: 2025-01-09

## 2025-02-25 RX ORDER — METOPROLOL SUCCINATE 25 MG/1
1 TABLET, EXTENDED RELEASE ORAL EVERY MORNING
COMMUNITY

## 2025-02-25 RX ORDER — ATORVASTATIN CALCIUM 20 MG/1
20 TABLET, FILM COATED ORAL EVERY MORNING
COMMUNITY

## 2025-02-25 RX ORDER — AMOXICILLIN 500 MG/1
CAPSULE ORAL
COMMUNITY

## 2025-02-25 NOTE — H&P (VIEW-ONLY)
Name: Carlos Larose      : 1970      MRN: [id number]  Encounter Provider: Neda Adams PA-C  Encounter Date: 2025   Encounter department: [facility] [place]  :  Assessment & Plan  GERD (gastroesophageal reflux disease)    Orders:    Ambulatory Referral to Gastroenterology    EGD; Future    Right flank pain  - He has a history of 4-5 months of R flank/RUQ pain which is constant and not postprandial and had a CT scan in the ER on  showing hepatic steatosis and a small periampullary diverticulum; he reports improvement since using protonix as well as a topical diclofenac  - We will schedule an EGD to assess for reflux, PUD, celiac disease  - His symptoms are generally constant and not postprandial so will hold off on biliary testing  Orders:    EGD; Future    Screening for colon cancer  - Schedule colonoscopy for initial colon cancer screening, miralax prep  Orders:    Colonoscopy; Future    RUQ pain    Orders:    EGD; Future        History of Present Illness   HPI  Carlos Larose is a 54 y.o. male who presents for follow-up after he was seen in the ER on the  with right sided back/flank pain.  He reports this been going on for several months and used to also radiate into his right upper quadrant.  He does have a history of belching postprandially and drinking a lot of coffee and he reports that his pain has improved with cutting down on coffee and portion sizes.  He went to the ER and had a CAT scan which showed a periampullary diverticulum and hepatic steatosis but no specific finding for his pain.  His lab work was unremarkable including a lipase level.  He was prescribed a PPI course as well as a diclofenac cream to put on the area and since taking both of those medications and states he has had improvement in the pain.  He has never had an endoscopy or colonoscopy in the past.  He denies any particular problems with his bowel movements or blood in the  stool.  History obtained from: patient    Review of Systems  Medical History Reviewed by provider this encounter:  Tobacco  Allergies  Meds  Problems  Med Hx  Surg Hx  Fam Hx     .  Current Outpatient Medications on File Prior to Visit   Medication Sig Dispense Refill    amoxicillin (AMOXIL) 500 mg capsule TAKE ONE TABLET EVERY 6 HOURS UNTIL COMPLETED      atorvastatin (LIPITOR) 20 mg tablet Take 20 mg by mouth every morning      cholecalciferol (VITAMIN D3) 1,000 units tablet Take 1 tablet (1,000 Units total) by mouth daily Do not start before June 30, 2023. 30 tablet 1    fenofibrate (TRICOR) 145 mg tablet Take 145 mg by mouth daily      metoprolol succinate (TOPROL-XL) 25 mg 24 hr tablet Take 1 tablet by mouth every morning      [DISCONTINUED] ARIPiprazole (ABILIFY) 10 mg tablet Take 1 tablet (10 mg total) by mouth daily at bedtime (Patient not taking: Reported on 2/25/2025) 30 tablet 1    [DISCONTINUED] cyanocobalamin (VITAMIN B-12) 1000 MCG tablet Take 1 tablet (1,000 mcg total) by mouth daily (Patient not taking: Reported on 2/25/2025) 30 tablet 1    [DISCONTINUED] Diclofenac Sodium (VOLTAREN) 1 % Apply 2 g topically 4 (four) times a day as needed (pain) (Patient not taking: Reported on 2/25/2025) 350 g 0    [DISCONTINUED] escitalopram (Lexapro) 10 mg tablet Take 1 tablet (10 mg total) by mouth daily at bedtime (Patient not taking: Reported on 2/25/2025) 30 tablet 1    [DISCONTINUED] famotidine (PEPCID) 20 mg tablet Take 1 tablet (20 mg total) by mouth 2 (two) times a day (Patient not taking: Reported on 5/23/2022) 14 tablet 0    [DISCONTINUED] pantoprazole (PROTONIX) 40 mg tablet Take 1 tablet (40 mg total) by mouth daily (Patient not taking: Reported on 2/25/2025) 30 tablet 0    [DISCONTINUED] traZODone (DESYREL) 50 mg tablet Take 1 tablet (50 mg total) by mouth daily at bedtime (Patient not taking: Reported on 2/25/2025) 30 tablet 1     No current facility-administered medications on file prior to  "visit.         Objective   /80 (Patient Position: Sitting, Cuff Size: Large)   Pulse 92   Temp (!) 96.9 °F (36.1 °C) (Temporal)   Resp 16   Ht 5' 7\" (1.702 m)   Wt 113 kg (250 lb)   SpO2 96%   BMI 39.16 kg/m²      Physical Exam  General- No acute distress  CV- RRR, no murmur  Pulm- CTA bilaterally, no respiratory distress  "

## 2025-02-25 NOTE — PATIENT INSTRUCTIONS
Scheduled date of EGD/colonoscopy (as of today):2/28/25  Physician performing EGD/colonoscopy:Hari  Location of EGD/colonoscopy:[facility]  Desired bowel prep reviewed with patient:miralax/dulcolax  Instructions reviewed with patient by:Awilda MENDES  Clearances:   none

## 2025-02-25 NOTE — PROGRESS NOTES
Name: Carlos Larose      : 1970      MRN: [id number]  Encounter Provider: Neda Adams PA-C  Encounter Date: 2025   Encounter department: [facility] [place]  :  Assessment & Plan  GERD (gastroesophageal reflux disease)    Orders:    Ambulatory Referral to Gastroenterology    EGD; Future    Right flank pain  - He has a history of 4-5 months of R flank/RUQ pain which is constant and not postprandial and had a CT scan in the ER on  showing hepatic steatosis and a small periampullary diverticulum; he reports improvement since using protonix as well as a topical diclofenac  - We will schedule an EGD to assess for reflux, PUD, celiac disease  - His symptoms are generally constant and not postprandial so will hold off on biliary testing  Orders:    EGD; Future    Screening for colon cancer  - Schedule colonoscopy for initial colon cancer screening, miralax prep  Orders:    Colonoscopy; Future    RUQ pain    Orders:    EGD; Future        History of Present Illness   HPI  Carlos Larose is a 54 y.o. male who presents for follow-up after he was seen in the ER on the  with right sided back/flank pain.  He reports this been going on for several months and used to also radiate into his right upper quadrant.  He does have a history of belching postprandially and drinking a lot of coffee and he reports that his pain has improved with cutting down on coffee and portion sizes.  He went to the ER and had a CAT scan which showed a periampullary diverticulum and hepatic steatosis but no specific finding for his pain.  His lab work was unremarkable including a lipase level.  He was prescribed a PPI course as well as a diclofenac cream to put on the area and since taking both of those medications and states he has had improvement in the pain.  He has never had an endoscopy or colonoscopy in the past.  He denies any particular problems with his bowel movements or blood in the  stool.  History obtained from: patient    Review of Systems  Medical History Reviewed by provider this encounter:  Tobacco  Allergies  Meds  Problems  Med Hx  Surg Hx  Fam Hx     .  Current Outpatient Medications on File Prior to Visit   Medication Sig Dispense Refill    amoxicillin (AMOXIL) 500 mg capsule TAKE ONE TABLET EVERY 6 HOURS UNTIL COMPLETED      atorvastatin (LIPITOR) 20 mg tablet Take 20 mg by mouth every morning      cholecalciferol (VITAMIN D3) 1,000 units tablet Take 1 tablet (1,000 Units total) by mouth daily Do not start before June 30, 2023. 30 tablet 1    fenofibrate (TRICOR) 145 mg tablet Take 145 mg by mouth daily      metoprolol succinate (TOPROL-XL) 25 mg 24 hr tablet Take 1 tablet by mouth every morning      [DISCONTINUED] ARIPiprazole (ABILIFY) 10 mg tablet Take 1 tablet (10 mg total) by mouth daily at bedtime (Patient not taking: Reported on 2/25/2025) 30 tablet 1    [DISCONTINUED] cyanocobalamin (VITAMIN B-12) 1000 MCG tablet Take 1 tablet (1,000 mcg total) by mouth daily (Patient not taking: Reported on 2/25/2025) 30 tablet 1    [DISCONTINUED] Diclofenac Sodium (VOLTAREN) 1 % Apply 2 g topically 4 (four) times a day as needed (pain) (Patient not taking: Reported on 2/25/2025) 350 g 0    [DISCONTINUED] escitalopram (Lexapro) 10 mg tablet Take 1 tablet (10 mg total) by mouth daily at bedtime (Patient not taking: Reported on 2/25/2025) 30 tablet 1    [DISCONTINUED] famotidine (PEPCID) 20 mg tablet Take 1 tablet (20 mg total) by mouth 2 (two) times a day (Patient not taking: Reported on 5/23/2022) 14 tablet 0    [DISCONTINUED] pantoprazole (PROTONIX) 40 mg tablet Take 1 tablet (40 mg total) by mouth daily (Patient not taking: Reported on 2/25/2025) 30 tablet 0    [DISCONTINUED] traZODone (DESYREL) 50 mg tablet Take 1 tablet (50 mg total) by mouth daily at bedtime (Patient not taking: Reported on 2/25/2025) 30 tablet 1     No current facility-administered medications on file prior to  "visit.         Objective   /80 (Patient Position: Sitting, Cuff Size: Large)   Pulse 92   Temp (!) 96.9 °F (36.1 °C) (Temporal)   Resp 16   Ht 5' 7\" (1.702 m)   Wt 113 kg (250 lb)   SpO2 96%   BMI 39.16 kg/m²      Physical Exam  General- No acute distress  CV- RRR, no murmur  Pulm- CTA bilaterally, no respiratory distress  "

## 2025-02-27 ENCOUNTER — TELEPHONE (OUTPATIENT)
Dept: GASTROENTEROLOGY | Facility: HOSPITAL | Age: 55
End: 2025-02-27

## 2025-02-27 NOTE — TELEPHONE ENCOUNTER
Pt calling back for arrival, advised 1215PM arrival time. Reminded pt of need for  18yrs+, pt confirmed understanding. Went over restrictions for jewelry, clothing and personal hygiene products. Discussed prep instructions.

## 2025-02-28 ENCOUNTER — ANESTHESIA (OUTPATIENT)
Dept: GASTROENTEROLOGY | Facility: HOSPITAL | Age: 55
End: 2025-02-28
Payer: COMMERCIAL

## 2025-02-28 ENCOUNTER — HOSPITAL ENCOUNTER (OUTPATIENT)
Dept: GASTROENTEROLOGY | Facility: HOSPITAL | Age: 55
Setting detail: OUTPATIENT SURGERY
Discharge: HOME | End: 2025-02-28
Payer: COMMERCIAL

## 2025-02-28 ENCOUNTER — ANESTHESIA EVENT (OUTPATIENT)
Dept: GASTROENTEROLOGY | Facility: HOSPITAL | Age: 55
End: 2025-02-28
Payer: COMMERCIAL

## 2025-02-28 VITALS
OXYGEN SATURATION: 94 % | BODY MASS INDEX: 38.24 KG/M2 | HEART RATE: 79 BPM | HEIGHT: 67 IN | RESPIRATION RATE: 20 BRPM | SYSTOLIC BLOOD PRESSURE: 113 MMHG | DIASTOLIC BLOOD PRESSURE: 86 MMHG | WEIGHT: 243.61 LBS | TEMPERATURE: 97.9 F

## 2025-02-28 DIAGNOSIS — R10.11 RUQ PAIN: ICD-10-CM

## 2025-02-28 DIAGNOSIS — Z12.11 SCREENING FOR COLON CANCER: ICD-10-CM

## 2025-02-28 DIAGNOSIS — R10.9 RIGHT FLANK PAIN: ICD-10-CM

## 2025-02-28 DIAGNOSIS — K21.9 GERD (GASTROESOPHAGEAL REFLUX DISEASE): ICD-10-CM

## 2025-02-28 PROCEDURE — 45385 COLONOSCOPY W/LESION REMOVAL: CPT | Performed by: INTERNAL MEDICINE

## 2025-02-28 PROCEDURE — 88305 TISSUE EXAM BY PATHOLOGIST: CPT | Performed by: PATHOLOGY

## 2025-02-28 PROCEDURE — 43239 EGD BIOPSY SINGLE/MULTIPLE: CPT | Performed by: INTERNAL MEDICINE

## 2025-02-28 RX ORDER — LIDOCAINE HYDROCHLORIDE 20 MG/ML
INJECTION, SOLUTION EPIDURAL; INFILTRATION; INTRACAUDAL; PERINEURAL AS NEEDED
Status: DISCONTINUED | OUTPATIENT
Start: 2025-02-28 | End: 2025-02-28

## 2025-02-28 RX ORDER — GLYCOPYRROLATE 0.2 MG/ML
INJECTION INTRAMUSCULAR; INTRAVENOUS AS NEEDED
Status: DISCONTINUED | OUTPATIENT
Start: 2025-02-28 | End: 2025-02-28

## 2025-02-28 RX ORDER — PROPOFOL 10 MG/ML
INJECTION, EMULSION INTRAVENOUS AS NEEDED
Status: DISCONTINUED | OUTPATIENT
Start: 2025-02-28 | End: 2025-02-28

## 2025-02-28 RX ORDER — SODIUM CHLORIDE, SODIUM LACTATE, POTASSIUM CHLORIDE, CALCIUM CHLORIDE 600; 310; 30; 20 MG/100ML; MG/100ML; MG/100ML; MG/100ML
INJECTION, SOLUTION INTRAVENOUS CONTINUOUS PRN
Status: DISCONTINUED | OUTPATIENT
Start: 2025-02-28 | End: 2025-02-28

## 2025-02-28 RX ORDER — ESCITALOPRAM OXALATE 10 MG/1
TABLET ORAL
COMMUNITY
Start: 2025-02-25

## 2025-02-28 RX ADMIN — TOPICAL ANESTHETIC 1 SPRAY: 200 SPRAY DENTAL; PERIODONTAL at 14:07

## 2025-02-28 RX ADMIN — PROPOFOL 200 MG: 10 INJECTION, EMULSION INTRAVENOUS at 14:11

## 2025-02-28 RX ADMIN — PROPOFOL 30 MG: 10 INJECTION, EMULSION INTRAVENOUS at 14:18

## 2025-02-28 RX ADMIN — PROPOFOL 20 MG: 10 INJECTION, EMULSION INTRAVENOUS at 14:15

## 2025-02-28 RX ADMIN — GLYCOPYRROLATE 0.2 MCG: 0.2 INJECTION INTRAMUSCULAR; INTRAVENOUS at 14:09

## 2025-02-28 RX ADMIN — LIDOCAINE HYDROCHLORIDE 100 MG: 20 INJECTION, SOLUTION EPIDURAL; INFILTRATION; INTRACAUDAL; PERINEURAL at 14:11

## 2025-02-28 RX ADMIN — PROPOFOL 50 MG: 10 INJECTION, EMULSION INTRAVENOUS at 14:24

## 2025-02-28 RX ADMIN — PROPOFOL 50 MG: 10 INJECTION, EMULSION INTRAVENOUS at 14:13

## 2025-02-28 RX ADMIN — PROPOFOL 50 MG: 10 INJECTION, EMULSION INTRAVENOUS at 14:21

## 2025-02-28 RX ADMIN — SODIUM CHLORIDE, SODIUM LACTATE, POTASSIUM CHLORIDE, AND CALCIUM CHLORIDE: .6; .31; .03; .02 INJECTION, SOLUTION INTRAVENOUS at 13:50

## 2025-02-28 NOTE — ANESTHESIA PREPROCEDURE EVALUATION
Procedure:  COLONOSCOPY  EGD    Relevant Problems   GI/HEPATIC   (+) GERD (gastroesophageal reflux disease)        Physical Exam    Airway    Mallampati score: I  TM Distance: >3 FB  Neck ROM: full     Dental       Cardiovascular  Cardiovascular exam normal    Pulmonary  Pulmonary exam normal     Other Findings        Anesthesia Plan  ASA Score- 2     Anesthesia Type- IV sedation with anesthesia with ASA Monitors.         Additional Monitors:     Airway Plan:            Plan Factors-Exercise tolerance (METS): >4 METS.    Chart reviewed. EKG reviewed. Imaging results reviewed. Existing labs reviewed. Patient summary reviewed.                  Induction- intravenous.    Postoperative Plan- Plan for postoperative opioid use. Planned trial extubation        Informed Consent- Anesthetic plan and risks discussed with patient.  I personally reviewed this patient with the CRNA. Discussed and agreed on the Anesthesia Plan with the CRNA..      NPO Status:  Vitals Value Taken Time   Date of last liquid 02/28/25 02/28/25 1242   Time of last liquid 0700 02/28/25 1242   Date of last solid 02/25/25 02/28/25 1242   Time of last solid 0900 02/28/25 1242

## 2025-02-28 NOTE — INTERVAL H&P NOTE
H&P reviewed. After examining the patient I find no changes in the patients condition since the H&P had been written.    Vitals:    02/28/25 1247   BP: 117/70   Pulse: 81   Resp: 18   Temp: 98 °F (36.7 °C)   SpO2: 97%

## 2025-03-05 PROCEDURE — 88305 TISSUE EXAM BY PATHOLOGIST: CPT | Performed by: PATHOLOGY

## 2025-03-06 ENCOUNTER — RESULTS FOLLOW-UP (OUTPATIENT)
Dept: GASTROENTEROLOGY | Facility: CLINIC | Age: 55
End: 2025-03-06
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
## 2025-08-10 ENCOUNTER — HOSPITAL ENCOUNTER (INPATIENT)
Facility: HOSPITAL | Age: 55
LOS: 3 days | Discharge: HOME | DRG: 093 | End: 2025-08-14
Attending: EMERGENCY MEDICINE | Admitting: STUDENT IN AN ORGANIZED HEALTH CARE EDUCATION/TRAINING PROGRAM
Payer: COMMERCIAL

## 2025-08-10 PROBLEM — E78.2 MIXED HYPERLIPIDEMIA: Status: ACTIVE | Noted: 2025-08-10

## 2025-08-10 PROBLEM — I10 PRIMARY HYPERTENSION: Status: ACTIVE | Noted: 2025-08-10

## 2025-08-11 ENCOUNTER — APPOINTMENT (OUTPATIENT)
Dept: CT IMAGING | Facility: HOSPITAL | Age: 55
DRG: 093 | End: 2025-08-11
Payer: COMMERCIAL

## 2025-08-13 ENCOUNTER — TELEPHONE (OUTPATIENT)
Age: 55
End: 2025-08-13

## 2025-08-14 PROBLEM — G92.9 TOXIC ENCEPHALOPATHY: Status: ACTIVE | Noted: 2022-05-23

## 2025-08-15 ENCOUNTER — TELEPHONE (OUTPATIENT)
Dept: PSYCHOLOGY | Facility: CLINIC | Age: 55
End: 2025-08-15

## 2025-08-18 ENCOUNTER — TELEPHONE (OUTPATIENT)
Dept: PSYCHOLOGY | Facility: CLINIC | Age: 55
End: 2025-08-18

## 2025-08-19 ENCOUNTER — OFFICE VISIT (OUTPATIENT)
Dept: PSYCHOLOGY | Facility: CLINIC | Age: 55
End: 2025-08-19
Payer: COMMERCIAL

## 2025-08-19 VITALS
DIASTOLIC BLOOD PRESSURE: 83 MMHG | WEIGHT: 219 LBS | TEMPERATURE: 97.4 F | SYSTOLIC BLOOD PRESSURE: 127 MMHG | HEIGHT: 67 IN | BODY MASS INDEX: 34.37 KG/M2 | HEART RATE: 83 BPM

## 2025-08-19 DIAGNOSIS — F39 MOOD DISORDER (HCC): Primary | ICD-10-CM

## 2025-08-19 PROCEDURE — G0410 GRP PSYCH PARTIAL HOSP 45-50: HCPCS

## 2025-08-19 PROCEDURE — 90792 PSYCH DIAG EVAL W/MED SRVCS: CPT | Performed by: STUDENT IN AN ORGANIZED HEALTH CARE EDUCATION/TRAINING PROGRAM

## 2025-08-19 PROCEDURE — S0201 PARTIAL HOSPITALIZATION SERV: HCPCS

## 2025-08-19 PROCEDURE — 90791 PSYCH DIAGNOSTIC EVALUATION: CPT

## 2025-08-19 RX ORDER — MULTIVITAMIN
1 TABLET ORAL DAILY
COMMUNITY
Start: 2025-07-23

## 2025-08-19 RX ORDER — ATORVASTATIN CALCIUM 40 MG/1
40 TABLET, FILM COATED ORAL EVERY MORNING
COMMUNITY
Start: 2025-07-13

## 2025-08-20 ENCOUNTER — OFFICE VISIT (OUTPATIENT)
Dept: PSYCHOLOGY | Facility: CLINIC | Age: 55
End: 2025-08-20
Payer: COMMERCIAL

## 2025-08-20 DIAGNOSIS — F39 MOOD DISORDER (HCC): Primary | ICD-10-CM

## 2025-08-21 ENCOUNTER — OFFICE VISIT (OUTPATIENT)
Dept: PSYCHOLOGY | Facility: CLINIC | Age: 55
End: 2025-08-21
Payer: COMMERCIAL

## 2025-08-21 DIAGNOSIS — F39 MOOD DISORDER (HCC): Primary | ICD-10-CM

## 2025-08-22 ENCOUNTER — DOCUMENTATION (OUTPATIENT)
Dept: PSYCHOLOGY | Facility: CLINIC | Age: 55
End: 2025-08-22